# Patient Record
Sex: FEMALE | Race: WHITE
[De-identification: names, ages, dates, MRNs, and addresses within clinical notes are randomized per-mention and may not be internally consistent; named-entity substitution may affect disease eponyms.]

---

## 2017-12-23 ENCOUNTER — HOSPITAL ENCOUNTER (INPATIENT)
Dept: HOSPITAL 92 - ERS | Age: 62
LOS: 1 days | Discharge: ANOTHER HOSPITAL | DRG: 811 | End: 2017-12-24
Attending: INTERNAL MEDICINE | Admitting: INTERNAL MEDICINE
Payer: MEDICARE

## 2017-12-23 VITALS — BODY MASS INDEX: 41.5 KG/M2

## 2017-12-23 DIAGNOSIS — E87.5: ICD-10-CM

## 2017-12-23 DIAGNOSIS — E11.22: ICD-10-CM

## 2017-12-23 DIAGNOSIS — E78.5: ICD-10-CM

## 2017-12-23 DIAGNOSIS — Z90.722: ICD-10-CM

## 2017-12-23 DIAGNOSIS — E87.79: ICD-10-CM

## 2017-12-23 DIAGNOSIS — C53.9: ICD-10-CM

## 2017-12-23 DIAGNOSIS — I24.8: ICD-10-CM

## 2017-12-23 DIAGNOSIS — E83.39: ICD-10-CM

## 2017-12-23 DIAGNOSIS — N18.6: ICD-10-CM

## 2017-12-23 DIAGNOSIS — N93.8: ICD-10-CM

## 2017-12-23 DIAGNOSIS — I12.0: ICD-10-CM

## 2017-12-23 DIAGNOSIS — Z95.5: ICD-10-CM

## 2017-12-23 DIAGNOSIS — Z90.710: ICD-10-CM

## 2017-12-23 DIAGNOSIS — E83.41: ICD-10-CM

## 2017-12-23 DIAGNOSIS — Z99.2: ICD-10-CM

## 2017-12-23 DIAGNOSIS — D62: Primary | ICD-10-CM

## 2017-12-23 DIAGNOSIS — Z90.79: ICD-10-CM

## 2017-12-23 DIAGNOSIS — E87.2: ICD-10-CM

## 2017-12-23 DIAGNOSIS — E66.01: ICD-10-CM

## 2017-12-23 DIAGNOSIS — Z91.15: ICD-10-CM

## 2017-12-23 DIAGNOSIS — N25.81: ICD-10-CM

## 2017-12-23 DIAGNOSIS — I25.10: ICD-10-CM

## 2017-12-23 DIAGNOSIS — C55: ICD-10-CM

## 2017-12-23 LAB
ALP SERPL-CCNC: 58 U/L (ref 40–150)
ALT SERPL W P-5'-P-CCNC: 8 U/L (ref 8–55)
ANION GAP SERPL CALC-SCNC: 29 MMOL/L (ref 10–20)
ANION GAP SERPL CALC-SCNC: 7 MMOL/L (ref -14–95)
APTT PPP: 24.7 SEC (ref 22.9–36.1)
AST SERPL-CCNC: 10 U/L (ref 5–34)
BASOPHILS # BLD AUTO: 0.1 THOU/UL (ref 0–0.2)
BASOPHILS NFR BLD AUTO: 0.6 % (ref 0–1)
BILIRUB SERPL-MCNC: 0.3 MG/DL (ref 0.2–1.2)
BUN SERPL-MCNC: 105 MG/DL (ref 9.8–20.1)
CALCIUM SERPL-MCNC: 8.9 MG/DL (ref 7.8–10.44)
CHLORIDE SERPL-SCNC: 115 MMOL/L (ref 98–113)
CHLORIDE SERPL-SCNC: 99 MMOL/L (ref 98–107)
CO2 BLDV CALC-SCNC: 16.3 MMOL/L (ref 1–85)
CO2 SERPL-SCNC: 17 MMOL/L (ref 23–31)
COMM CRITICAL RESULTS DOC: (no result)
COMM CRITICAL RESULTS DOC: (no result)
CREAT CL PREDICTED SERPL C-G-VRATE: 0 ML/MIN (ref 70–130)
EOSINOPHIL # BLD AUTO: 0.2 THOU/UL (ref 0–0.7)
EOSINOPHIL NFR BLD AUTO: 1.8 % (ref 0–10)
GLOBULIN SER CALC-MCNC: 3.1 G/DL (ref 2.4–3.5)
GLUCOSE UR STRIP-MCNC: 250 MG/DL
HCT VFR BLD CALC: 18.8 % (ref 36–47)
HYALINE CASTS #/AREA URNS LPF: (no result) LPF
LIPASE SERPL-CCNC: 47 U/L (ref 8–78)
LYMPHOCYTES # BLD: 1.1 THOU/UL (ref 1.2–3.4)
LYMPHOCYTES NFR BLD AUTO: 10.8 % (ref 21–51)
MAGNESIUM SERPL-MCNC: 2.8 MG/DL (ref 1.6–2.6)
MONOCYTES # BLD AUTO: 0.3 THOU/UL (ref 0.11–0.59)
MONOCYTES NFR BLD AUTO: 3.1 % (ref 0–10)
NEUTROPHILS # BLD AUTO: 8.5 THOU/UL (ref 1.4–6.5)
PROT UR STRIP.AUTO-MCNC: 300 MG/DL
PROTHROMBIN TIME: 16 SEC (ref 12–14.7)
RBC # BLD AUTO: 1.82 MILL/UL (ref 4.2–5.4)
SAO2 % BLDV FROM PO2: 99.5 % (ref 0–100)
TROPONIN I SERPL DL<=0.01 NG/ML-MCNC: 2.35 NG/ML (ref ?–0.03)
TROPONIN I SERPL DL<=0.01 NG/ML-MCNC: 2.56 NG/ML (ref ?–0.03)
TROPONIN I SERPL DL<=0.01 NG/ML-MCNC: 2.73 NG/ML (ref ?–0.03)
TROPONIN I SERPL DL<=0.01 NG/ML-MCNC: 3.1 NG/ML (ref ?–0.03)
WBC # BLD AUTO: 10.1 THOU/UL (ref 4.8–10.8)

## 2017-12-23 PROCEDURE — 36415 COLL VENOUS BLD VENIPUNCTURE: CPT

## 2017-12-23 PROCEDURE — 30233N1 TRANSFUSION OF NONAUTOLOGOUS RED BLOOD CELLS INTO PERIPHERAL VEIN, PERCUTANEOUS APPROACH: ICD-10-PCS | Performed by: INTERNAL MEDICINE

## 2017-12-23 PROCEDURE — 80048 BASIC METABOLIC PNL TOTAL CA: CPT

## 2017-12-23 PROCEDURE — 82330 ASSAY OF CALCIUM: CPT

## 2017-12-23 PROCEDURE — 82010 KETONE BODYS QUAN: CPT

## 2017-12-23 PROCEDURE — 51701 INSERT BLADDER CATHETER: CPT

## 2017-12-23 PROCEDURE — 96375 TX/PRO/DX INJ NEW DRUG ADDON: CPT

## 2017-12-23 PROCEDURE — 87340 HEPATITIS B SURFACE AG IA: CPT

## 2017-12-23 PROCEDURE — 84484 ASSAY OF TROPONIN QUANT: CPT

## 2017-12-23 PROCEDURE — A4353 INTERMITTENT URINARY CATH: HCPCS

## 2017-12-23 PROCEDURE — 86901 BLOOD TYPING SEROLOGIC RH(D): CPT

## 2017-12-23 PROCEDURE — 90935 HEMODIALYSIS ONE EVALUATION: CPT

## 2017-12-23 PROCEDURE — 85610 PROTHROMBIN TIME: CPT

## 2017-12-23 PROCEDURE — 83690 ASSAY OF LIPASE: CPT

## 2017-12-23 PROCEDURE — 82553 CREATINE MB FRACTION: CPT

## 2017-12-23 PROCEDURE — 81003 URINALYSIS AUTO W/O SCOPE: CPT

## 2017-12-23 PROCEDURE — 74176 CT ABD & PELVIS W/O CONTRAST: CPT

## 2017-12-23 PROCEDURE — P9016 RBC LEUKOCYTES REDUCED: HCPCS

## 2017-12-23 PROCEDURE — 36416 COLLJ CAPILLARY BLOOD SPEC: CPT

## 2017-12-23 PROCEDURE — 85025 COMPLETE CBC W/AUTO DIFF WBC: CPT

## 2017-12-23 PROCEDURE — 93005 ELECTROCARDIOGRAM TRACING: CPT

## 2017-12-23 PROCEDURE — 80053 COMPREHEN METABOLIC PANEL: CPT

## 2017-12-23 PROCEDURE — 36430 TRANSFUSION BLD/BLD COMPNT: CPT

## 2017-12-23 PROCEDURE — 84100 ASSAY OF PHOSPHORUS: CPT

## 2017-12-23 PROCEDURE — 83735 ASSAY OF MAGNESIUM: CPT

## 2017-12-23 PROCEDURE — 71010: CPT

## 2017-12-23 PROCEDURE — 5A1D70Z PERFORMANCE OF URINARY FILTRATION, INTERMITTENT, LESS THAN 6 HOURS PER DAY: ICD-10-PCS | Performed by: INTERNAL MEDICINE

## 2017-12-23 PROCEDURE — 93306 TTE W/DOPPLER COMPLETE: CPT

## 2017-12-23 PROCEDURE — 83880 ASSAY OF NATRIURETIC PEPTIDE: CPT

## 2017-12-23 PROCEDURE — 86850 RBC ANTIBODY SCREEN: CPT

## 2017-12-23 PROCEDURE — 81015 MICROSCOPIC EXAM OF URINE: CPT

## 2017-12-23 PROCEDURE — 96365 THER/PROPH/DIAG IV INF INIT: CPT

## 2017-12-23 PROCEDURE — G0257 UNSCHED DIALYSIS ESRD PT HOS: HCPCS

## 2017-12-23 PROCEDURE — 82803 BLOOD GASES ANY COMBINATION: CPT

## 2017-12-23 PROCEDURE — 86900 BLOOD TYPING SEROLOGIC ABO: CPT

## 2017-12-23 PROCEDURE — 85730 THROMBOPLASTIN TIME PARTIAL: CPT

## 2017-12-23 NOTE — HP
DATE OF ADMISSION:  12/23/2017

 

PRIMARY CARE PHYSICIAN:  None.

 

CHIEF COMPLAINT:  Vaginal bleed and missed hemodialysis and generalized weakness.

 

HISTORY OF PRESENT ILLNESS:  Ms. Padilla is a 62-year-old female with known history of uterine cancer
 status post incomplete treatment 3 years ago and hysterectomy as well as end-stage renal disease and
 coronary artery disease and diabetes, who presented to the emergency room with the above-mentioned c
omplaint.  History is mainly obtained by the patient herself, who is somewhat a poor historian.  Medi
viviane records have been reviewed extensively.  She was last admitted to our facility with similar compl
aints in 07/2017 and was admitted by myself.  At that time, she was seen by OB/GYN as well as Oncolog
y for vaginal bleed secondary to cervical cancer and was referred back to her primary oncologist in [facility] in [place]; Dr. Navarro. Today, she tells me that she has once again failed to follow up. 
 She has 2 deaths in her family including her sister and her dad and is currently dependent on her ne
phew for her daily care and has not been able to follow up.  She says that they did call and try to m
madelin appointment, but we were unable to get the appointment at Dr. Navarro's office.

 

She presented to the emergency room today complaining of abdominal pain, weakness, and muscle crampin
g.  She reports of passing significant blood clots for the last many days.  She reports that she pass
es blood clots on and off and sometimes they are really bad.  Some months, she does not have any blee
d at all but some months is really bad.

 

Today upon presentation, she was hemodynamically stable with the blood pressure of 148/73 and pulse o
f 70.  Her examination including of gynecological examination did showed significant clot in the vagi
na, as well as significant lab abnormalities due to missed hemodialysis.  She was found to be hyperka
lemic with a potassium of 7.5 with significant metabolic acidosis.  She was found to be in gross flui
d overloaded with the BNP of 2700.  She also had elevated troponin in the range of 3.098 with repeat 
troponin of 2.728.

 

In the emergency room, 2 units packed RBC were ordered for him.  She was treated for hyperkalemia wit
h calcium chloride as well as insulin with dextrose and is now being admitted for further evaluation 
and care.  Her nephrologist, Dr. Holland has been contacted by the ER physician and she has actually 
finished an emergent hemodialysis session.  Reportedly, she did not tolerate any fluid removal.  She 
has seen and examined in the dialysis unit.

 

PAST MEDICAL HISTORY:

1.  End-stage renal disease, on hemodialysis.

2.  History of gram negative bacteremia with Klebseilla in 01/2016.

3.  Coronary artery disease with history of stent placement.

4.  Hypertension.

5.  Diabetes.

6.  Dyslipidemia.

7.  Uterine cancer with status post hysterectomy and chemotherapy x1 in 10/2015.

8.  Chronic anemia.

9.  Lower extremity ulcers, chronic.

 

PAST SURGICAL HISTORY:

1.  Breast reduction.

2.  Hysterectomy with oophorectomy.

3.  Appendectomy.

4.  Hernia repair.

5.  Cardiac stent placement.

 

ALLERGIES:  LEVOFLOXACIN and FLAGYL.

 

SOCIAL HISTORY:  She currently lives at home with her nephew, who is the primary care provider.  No h
istory of drug, tobacco, or alcohol abuse.

 

FAMILY HISTORY:  Significant for heart disease.

 

CURRENT MEDICATIONS:  Unknown.  The patient did not bring the list of the medications with her.  Acco
rding to the ER note, she takes the following carvedilol 16.25 b.i.d., Renvela 800 mg 3 tablets three
 times a day, Rohini-Emerald 0.8 mg daily, atorvastatin 20 mg daily, amitriptyline 25 mg 3 tablets at bedt
chas, Plavix 75 mg daily, and hydralazine 50 mg t.i.d.

 

REVIEW OF SYSTEMS: The following complete review of systems was negative, unless otherwise mentioned 
in the HPI or below:

Constitutional: Weight loss or gain, ability to conduct usual activities.

Skin: Rash, itching.

Eyes: Double vision, pain.

ENT/Mouth: Nose bleeding, neck stiffness, pain, tenderness.

Cardiovascular: Palpitations, dyspnea on exertion, orthopnea.

Respiratory: Shortness of breath, wheezing, cough, hemoptysis, fever or night sweats.

Gastrointestinal: Poor appetite, abdominal pain, heartburn, nausea, vomiting, constipation, or diarrh
ea.

Genitourinary: Urgency, frequency, dysuria, nocturia.

Musculoskeletal: Pain, swelling.

Neurologic/Psychiatric: Anxiety, depression.

Allergy/Immunologic: Skin rash, bleeding tendency.

 

PHYSICAL  EXAMINATION:

VITAL SIGNS:  Most recent blood pressure 141/79, pulse of 74, respirations 22, saturating 100% on carolyn
m air, and temperature on presentation; afebrile.

GENERAL:  She appears pale, weak, and tired looking, but no acute distress.  Awake, alert, oriented x
3.

HEENT:  Conjunctival pallor is noted.  No scleral icterus.  Head is normocephalic, atraumatic.  Pupil
s are equal, reactive to light and accommodation.  Extraocular movements intact.

NECK:  Supple without any lymphadenopathy, JVD, or bruit.

CHEST:  Clear to auscultation with some bibasilar rales.  No wheezes noted.

CARDIOVASCULAR:  Regular rate and rhythm is regular without any murmur, rubs or gallops.

ABDOMEN:  Obese, somewhat tender to palpation and distended.

EXTREMITIES:  Dialysis fistula with thrill and bruit present.  Some pedal edema bilaterally without a
ny erythema.

NEUROLOGIC:  Nonfocal.

SKIN:  Free of any rashes or bruises, feels warm and dry to touch.

PSYCHIATRIC:  Depressed affect.

 

LABORATORY DATA:  A 12 lead EKG by my review showed some T-wave inversion in lead 1, 2, aVL, and V3, 
and V6.  Normal sinus rhythm.

 

CBC shows a hemoglobin of 6.0 with hematocrit of 18.8, platelet count of 268.  WBC is 10.1 with 83% n
eutrophils.  PT, PTT, and INR were within normal limits.

 

Serum chemistries show potassium of 7.5 with a repeat potassium of 7.  Bicarbonate 17, anion gap 29, 
, creatinine 13.5, blood sugar 291, troponin 3.098 with repeat troponin of 2.7-8.  Beta hydrox
ybutyrate is 0.74.  Urinalysis show hyalin casts and trace of blood along with protein urine and gluc
osuria, but no bacteria.

 

IMPRESSION AND PLAN:

1.  Acute blood loss anemia.  The patient is status post transfusion of 2 units packed red blood cell
s.  We will monitor hemoglobin and hematocrit closely and consult Obstetrics/Gynecology for ongoing v
aginal bleed.  She will be in IMCU for close monitoring.  She is currently hemodynamically stable.

2.  Fluid overload, hemodialysis urgently has been done.  We will monitor fluid status closely.  Neph
rology has been consulted.

3.  Hyperkalemia.  We will recheck labs after the hemodialysis.  She has been treated emergently in [facility] emergency room with potassium chloride and insulin.

4.  Elevated cardiac enzymes.  This is most likely secondary to demand ischemia from significant bloo
d loss.  At this time, we will repeat the troponin and obtain a transthoracic echocardiogram.  The la
st one for her was done in 09/2016, which was rather unremarkable.  She also had a nuclear medicine s
tress test done at the same time, which did not show any active ischemia.  If her troponins continue 
to trend upwards, we would request consultations with Cardiology.  At this time, we will restart her 
home medications except for the Plavix given the vaginal bleed.  The patient does not have any chest 
pain at this time.

5.  Anion gap metabolic acidosis.  This is secondary to acute on chronic kidney insufficiency.  Hemod
ialysis as indicated.  I have discussed this with Dr. Holland and the patient will be treated with da
erick hemodialysis until the electrolyte abnormalities are settle.

6.  Acute on chronic kidney insufficiency due to missed hemodialysis.  Hemodialysis as tolerated as p
er Nephrology.

7.  Hyperphosphatemia and hypomagnesemia.  Once again electrolyte abnormalities will be taken care of
 by the hemodialysis.  This is secondary to  acute on chronic kidney insufficiency.

8.  History of hypertension, currently controlled.  We will restart her home medications once confirm
ed.

9.  History of uterine cancer.  Once again, I have encouraged the patient to call her oncologist and 
make a followup appointment.  Because of the Christmas holidays, the offices closed for the next 3 da
ys.  I will discuss this with her nephew, who is currently responsible for her care and emphasized th
e need for followups.

10.  Dyslipidemia.  We will restart her Statin, once the dose is confirmed.

11.  Code status:  FULL CODE for now.  Discussed with the patient.

12.  Add sequential compression devices for gastrointestinal prophylaxis and proton pump inhibitors f
or GI prophylaxis and sequential compression devices for deep venous thrombosis prophylaxis.  Avoid p
harmacological deep venous thrombosis prophylaxis at this time due to active vaginal bleed.

 

DISPOSITION:  Ms. Padilla is currently being admitted due to acute on chronic kidney insufficiency as
 well as acute blood loss anemia due to vaginal bleed. 

 

Estimated length of stay is at least 2-3 midnight.  She will be admitted to [facility] and further manageme
nt will depend upon her clinical course.

## 2017-12-23 NOTE — CON
DATE OF SERVICE:  12/23/2017

 

TYPE OF CONSULTATION:  Gynecologic.

 

REASON FOR CONSULTATION:  Heavy vaginal bleeding and anemia.

 

HISTORY OF PRESENT ILLNESS:  At the time of presentation, Ms. Padilla is a 62-year-old female who is 
actually known to me from her admission in 07/2017 for similar complaints.  At that time, Ms. Padilla
 was found to have likely recurrence of the clear cell carcinoma for which she had a hysterectomy by 
Dr. Navarro in [place].  Unfortunately, Ms. Padilla has not had a good followup for this condition and h
as had progression of the fungating mass in the vagina and at the vaginal cuff on the abdominal side 
with intermittent heavy vaginal bleeding.  The patient reports sustaining lower abdominal pain that s
he complained of several months ago as well as early satiety, rare nausea and vomiting.  She states s
he does have regular bowel movements.  The patient states that she does still urinate despite her end
-stage renal disease and dialysis and that is unchanged.  She denies any cardiovascular or respirator
y complaints currently but did receive dialysis earlier today.  She does complain of worsening in her
 weakness and fatigue.

 

LIMITED REVIEW OF SYSTEMS:  Per HPI.

 

PAST MEDICAL HISTORY:  Obtained from the patient's chart, clear cell adenocarcinoma of the uterus, en
d-stage renal disease, coronary artery disease, history of MI with stent placement, type 2 diabetes, 
chronic hypertension.

 

PAST SURGICAL HISTORY:  AWA-BSO, breast reduction, appendectomy, hernia repair.

 

ALLERGIES:  LEVOFLOXACIN and METRONIDAZOLE.

 

CURRENT MEDICATIONS:  Please refer to the patient's current medical record.

 

PHYSICAL EXAMINATION:

VITAL SIGNS:  Temperature 97.6, pulse 83, respiratory rate 22, blood pressure 141/32.

GENERAL:  Nontoxic-appearing female, in no acute distress.

SKIN:  The patient seems reasonably oriented and actually remembers me from her hospital stay in 07/2
017 but does not seem to remember some details of her medical care.  There are no family members pres
ent in the room.  The patient states she lives with her niece and her nephew generally takes her to h
er dialysis appointment.

HEENT:  Normocephalic, atraumatic.

LUNGS:  Clear to auscultation in the upper lung fields.

CARDIOVASCULAR:  Regular rate and rhythm.

ABDOMEN:  Obese, tender in the lower midline pelvis.

EXTREMITIES:  No cyanosis or clubbing, but there is marked edema 2+ on the right, 3+ on the left with
 pitting.  The patient states that her edema is always greater on the left and there is some associat
ed left leg pain which is also chronic.

 

LABORATORY DATA AND IMAGING STUDIES:  White blood cell count 10.1, hemoglobin 6.0, hematocrit 18.8, p
latelets 268,000.  PT 16, PTT 24.7, INR 1.3.  Sodium 137, potassium 7.5, , creatinine 13.5, AS
T and ALT are 10 and 8, respectively.  Urinalysis is negative for pyuria.

 

CT scan of the abdomen and pelvis without contrast is notable for marked enlargement of progressive n
odularity of the mass in the region of the vaginal cuff and cervix as well as extensive worsening of 
left-sided pelvic adenopathy since her prior scan on 07/14/2017, enlarging lymph nodes in the left up
per iliac chain.  No mention is made of free fluid in the pelvis.

 

ASSESSMENT AND PLAN:  Ms. Padilla is a very pleasant 62-year-old female with multiple medical problem
s and lost to follow up and treatment for her clear cell adenocarcinoma of the uterus for which she d
id receive a AWA and BSO.  The patient only received 1 dose of chemotherapy and has not been seen aft
er that.  After the patient's discharge in 07/2017, she was supposed to follow up with Oncology who c
ould hopefully discuss treatment options for controlling the patient's bleeding and pain.  Apparently
, this follow up never happened.  The patient does not remember seeing an oncologist or going to an O
ncology visit, although she does remember going to dialysis treatments and thinks that those were the
 only doctors' visits that she has had.  The patient is not, at the time of my evaluation, having hea
vy vaginal bleeding.  I did defer the exam simply because there was no active bleeding at this time a
nd given that she has a fungating vaginal mass, hesitated to disturb it and cause more bleeding.

 

Ultimately, we will need to have Oncology consult on this patient and make recommendations for treatm
ent.  Additionally, perhaps  can be employed to ensure that the patient has an appoint
ment with transportation if needed and to possibly explore the factors that may be preventing her fro
m receiving the care she needs as an outpatient.

## 2017-12-23 NOTE — CON
DATE OF CONSULTATION:  12/23/2017

 

CONSULTING PHYSICIAN:  Dr. Micaela Juarez.

 

REASON FOR CONSULTATION:  End-stage renal disease.

 

REASON FOR ADMISSION:  Bleeding, anemia, hyperkalemia, and weakness.

 

HISTORY OF PRESENT ILLNESS:  This is a 62-year-old female with history of uterine cancer, end-stage r
enal disease, coronary artery disease, diabetes, who came to the hospital with weakness.  She missed 
dialysis for almost a week and was feeling weak.  She was found to have potassium of 7 with anemia, a
nd hemoglobin of 6, she was complaining of vaginal bleeding.

 

The patient was seen and the patient initially refused dialysis, but was agreeable after counseling. 
 No fever or chills, no nausea, vomiting, or diarrhea.  The patient was complaining of leg pain.

 

PAST MEDICAL HISTORY:  Positive for end-stage renal disease, uterine cancer, anemia, coronary artery 
disease, hypertension, type 1 diabetes, and obesity.

 

PAST SURGICAL HISTORY:  Breast reduction surgery, hysterectomy, appendectomy, hernia repair, and card
iac stent placement.

 

HOME MEDICATIONS:  Carvedilol, Renvela, Rohini-Emerald, atorvastatin, amitriptyline, Plavix, and hydralazi
ne.

 

ALLERGIES:  LEVOFLOXACIN and FLAGYL.

 

SOCIAL HISTORY:  No smoking, alcohol, or illicit drug abuse.

 

FAMILY HISTORY:  Positive for heart disease.

 

REVIEW OF SYSTEMS:  The following complete review of systems was negative, unless otherwise mentioned
 in the HPI or below.

 

PHYSICAL EXAMINATION:

GENERAL:  This is an obese female in no apparent distress.

VITAL SIGNS:  Temperature 97.6, pulse 83, respiratory rate 22, and blood pressure 114/32.

HEENT:  Atraumatic, normocephalic.  Oral mucosa is moist.

NECK:  Supple, no masses.

CARDIOVASCULAR:  S1 and S2 heard.  Rate and rhythm regular.

RESPIRATORY:  Clear.

MUSCULOSKELETAL:  1+ edema.

DERMATOLOGIC:  No skin rash.

NEUROLOGIC:  Alert and awake.

PSYCHIATRIC:  Mood and affect normal.

 

LABORATORY AND X-RAY FINDINGS:  Hemoglobin was 6, potassium was 7.

 

ASSESSMENT AND PLAN:

1.  Severe hyperkalemia, emergent dialysis.

2.  End-stage renal disease as above.

3.  Anemia.  We will transfuse with dialysis.

4.  Edema, controlled.

5.  Hypertension.

5.  Vaginal bleeding, follow up with primary team.

 

The plan is to transfuse with dialysis and will have dialysis.  Repeat labs in the morning.  If potas
sium remains elevated, we will have another session of dialysis in the morning.

 

Thank you for the consult.

## 2017-12-23 NOTE — CT
ABDOMEN AND PELVIC CT SCAN WITHOUT IV CONTRAST:

12/23/17

 

The lung bases appear clear. There is some cardiomegaly with extensive three vessel coronary artery c
alcific disease. There is extensive vascular calcifications. There is some minimal increased attenuat
ion density in the dependent portion of the gallbladder possibly either very small stones or thick sl
udge without gallbladder wall thickening or pericholecystic fluid. The kidneys are markedly small best
aterally, having the appearance of longstanding chronic nonspecific renal disease without hydronephro
sis. The visualized pancreas, spleen, and adrenal glands are unremarkable. In the pelvis, there is a 
lobulated mass in the region of the cervix measuring approximately 4.7 x 6.2 cm. In addition, there i
s a lobulated mass of adenopathy in the left pelvis measuring approximately 5.8 x 6.6 cm. This adenop
athy area has markedly increased in size when compared to the 7/14/17 study. The mass in the region o
f the cervix also appears to be more lobulated and has enlarged since the prior study. There appears 
to be some new adenopathy, somewhat more caudally in the left pelvic sidewall at the level of the hip
 joint. 

 

IMPRESSION:  

Marked enlargement and progressive nodularity of mass in the region of the vaginal cuff and cervix as
 well as extensive worsening of left sided pelvic adenopathy since prior 7/14/17 study. Certainly wor
risome for worsening cervical cancer and metastasis. Small chronic kidneys bilaterally, stable. Stabl
e increased density in the dependent portion of the gallbladder. Enlarging lymph node in the left upp
er iliac chain. 

 

POS: DANUTA

## 2017-12-24 VITALS — DIASTOLIC BLOOD PRESSURE: 47 MMHG | SYSTOLIC BLOOD PRESSURE: 109 MMHG

## 2017-12-24 VITALS — TEMPERATURE: 98 F

## 2017-12-24 LAB
ANION GAP SERPL CALC-SCNC: 16 MMOL/L (ref 10–20)
BASOPHILS # BLD AUTO: 0.1 THOU/UL (ref 0–0.2)
BASOPHILS NFR BLD AUTO: 0.7 % (ref 0–1)
BUN SERPL-MCNC: 38 MG/DL (ref 9.8–20.1)
CALCIUM SERPL-MCNC: 8.3 MG/DL (ref 7.8–10.44)
CHLORIDE SERPL-SCNC: 99 MMOL/L (ref 98–107)
CO2 SERPL-SCNC: 27 MMOL/L (ref 23–31)
CREAT CL PREDICTED SERPL C-G-VRATE: 13 ML/MIN (ref 70–130)
EOSINOPHIL # BLD AUTO: 0.2 THOU/UL (ref 0–0.7)
EOSINOPHIL NFR BLD AUTO: 2.3 % (ref 0–10)
HCT VFR BLD CALC: 21.5 % (ref 36–47)
LYMPHOCYTES # BLD: 1.7 THOU/UL (ref 1.2–3.4)
LYMPHOCYTES NFR BLD AUTO: 18.2 % (ref 21–51)
MONOCYTES # BLD AUTO: 0.6 THOU/UL (ref 0.11–0.59)
MONOCYTES NFR BLD AUTO: 6.5 % (ref 0–10)
NEUTROPHILS # BLD AUTO: 6.9 THOU/UL (ref 1.4–6.5)
RBC # BLD AUTO: 2.19 MILL/UL (ref 4.2–5.4)
TROPONIN I SERPL DL<=0.01 NG/ML-MCNC: 2.8 NG/ML (ref ?–0.03)
WBC # BLD AUTO: 9.6 THOU/UL (ref 4.8–10.8)

## 2017-12-24 PROCEDURE — 30233N1 TRANSFUSION OF NONAUTOLOGOUS RED BLOOD CELLS INTO PERIPHERAL VEIN, PERCUTANEOUS APPROACH: ICD-10-PCS | Performed by: INTERNAL MEDICINE

## 2017-12-24 PROCEDURE — 02HV33Z INSERTION OF INFUSION DEVICE INTO SUPERIOR VENA CAVA, PERCUTANEOUS APPROACH: ICD-10-PCS | Performed by: SURGERY

## 2017-12-24 NOTE — OP
DATE OF PROCEDURE:  12/24/2017

 

PREOPERATIVE DIAGNOSES:  Anemia, vaginal bleeding, end-stage renal disease.

 

POSTOPERATIVE DIAGNOSES:  Anemia, vaginal bleeding, end-stage renal disease.

 

PROCEDURE:  Left subclavian vein triple lumen catheter.

 

SURGEON:  Dr. Rex Xiao

 

ANESTHESIA:  1%  Xylocaine.

 

PROCEDURE:  At the patient's bedside, left periclavicular area was prepared with chloraprep, draped i
n routine fashion.  1% Xylocaine infiltrated into skin and subcutaneous tissue about the operative si
te.  Trocar catheter cannulated the subclavian vein and J-wire threaded.  Seldinger technique was use
d to place a triple lumen catheter, removing the J-wire, securing the catheter with 2 interrupted sut
ures of 3-0 silk.  Biopatch sterile dressing applied.  Each port aspirated blood and transected solut
ion and flushed each port.  Patient tolerated the procedure well.

## 2017-12-24 NOTE — PRG
DATE OF SERVICE:  12/24/2017

 

HISTORY OF PRESENT ILLNESS:  The patient is a 62-year-old female with known recurrent clear cell carc
inoma located at the cuff of the vagina.  She presented for vaginal bleeding and has been transfused 
now 3 units of packed red blood cells.  The patient at this time is having minimal-to-no vaginal blee
ding, though presented after having very heavy vaginal bleeding that has spontaneously stopped.

 

PHYSICAL EXAMINATION:

VITAL SIGNS:  Currently, 128/40, temperature 98.2, respiratory rate 16, satting 95% on room air with 
a pulse of 69.

GENERAL:  She appears to be in no acute distress at this time, sitting up quiet in the bed.

 

LABORATORY STUDIES:  Her hemoglobin is 7.0 after 2 units of packed red blood cells, Hematocrit of 21.
5, and platelets of 221,000.  CT scan demonstrates marked enlargement and progressive nodularity of t
he mass in the region of the vaginal cuff as well as extensive worsening of left-sided pelvic adenopa
thy since 07/14/2017.

 

ASSESSMENT:  The patient is a 62-year-old female with acute vaginal bleeding that had since spontaneo
usly stopped.  Bleeding is likely connected with what is likely recurrent uterine cancer at the cuff 
of her vagina that is growing and likely a spread into the right pelvis and possible to other parts o
f the body.  Acutely patient is stable; however, unpredictable nature of her heavy vaginal bleeding m
akes the need for evaluation by Gynecology-Oncology and a treatment plan put in place urgent.  The pa
vahe did not follow up as planned in 07/2017 nor followed up appropriately at the time of her diagno
sis and surgery several years ago.  My recommendations at this time would be a [id number] t
zaysfer where she can be evaluated by Gynecology-Oncology and a treatment plan put in place.  That pl
an likely would include radiation to the vaginal cuff, which could in the long-term control her bleed
ing.  The gynecologic oncologist, who agreed to see her back in July is Dr. Navarro, [phone].  Nelson red, at this point in time transfer to any location where she can continue receiving the hospital car
e she needs and be seen by a gynecologic oncologist for evaluation and treatment would be best.

## 2017-12-24 NOTE — PRG
DATE OF SERVICE:  12/25/2017

 

SUBJECTIVE:  Patient was seen and examined at bedside and overnight events noted.  Patient denies any
 shortness of breath or chest pain or palpitation.  No history of nausea or vomiting or diarrhea or f
ever or chills or cramps.

 

OBJECTIVE:

GENERAL:  This is a morbidly obese white female in no apparent distress.

VITAL SIGNS:  Temperature 97, pulse 91, respiratory rate 16, blood pressure 146/34.

HEENT:  Atraumatic, normocephalic.  Oral mucosa is moist.

NECK:  Supple.

CARDIOVASCULAR:  S1, S2 heard.  Rate and rhythm regular.

RESPIRATORY:  Clear to auscultation.

GASTROINTESTINAL:  Abdomen is soft.

MUSCULOSKELETAL:  No tenderness.  No edema.

DERMATOLOGIC:  No skin rash.

NEUROLOGIC:  Alert and awake and oriented x3.  No focal neurologic deficits. Moving all the extremiti
es.

PSYCHIATRIC:  Mood and affect normal.

 

LABORATORY DATA:  Potassium is 4.5, BUN is 38, creatinine 6.7, and hemoglobin is 7.7.

 

ASSESSMENT AND PLAN:

1.  End-stage renal disease on hemodialysis today and potassium level is better.  No acute indication
 for dialysis.

2.  Hyperkalemia, much better with dialysis.

3.  Fluid overload, stable.

4.  Anemia.  Hemoglobin is slightly better after transfusion, but still around 7.  Agree with another
 unit of transfusion.  The patient does not need dialysis with transfusion today.  We will monitor.  
If needed, we will arrange dialysis tomorrow.

5.  Hypertension, stable.

6.  Anemia, most likely secondary to blood loss.

7.  Morbid obesity.

8.  Edema, controlled.

 

Plan is to continue on dialysis as tolerated.  We will continue close monitoring of labs.

## 2017-12-24 NOTE — RAD
UPRIGHT CHEST ONE VIEW:

 

History: 

62-year-old female with history of cervical cancer with left central line placement for position eval
uation. 

 

Comparison: 

9-7-16

 

FINDINGS: 

There is a left subclavian catheter with the tip in the superior vena cava. Left subclavian vascular 
stent. Minimal cardiomegaly. No evidence for pneumothorax or pleural effusion or other acute process.
 

 

IMPRESSION: 

Left subclavian catheter placement without complication. Minimal cardiomegaly. 

 

POS: DANUTA

## 2017-12-24 NOTE — PDOC.PN
- Subjective


Encounter Start Date: 12/24/17


Encounter Start Time: 15:13


Subjective: feels better.stiil some on and off bleed but minor


-: no chest pain/sob.


-: c/o lower abdominal pain





- Objective


MAR Reviewed: Yes


Vital Signs & Weight: 


 Vital Signs (12 hours)











  Temp Pulse Pulse Resp BP BP BP


 


 12/24/17 15:02  98.0 F   66  16   123/44 L 


 


 12/24/17 12:30  98.2 F   69  16   128/40 L 


 


 12/24/17 11:50  97.2 F L  66   16    107/36 L


 


 12/24/17 09:02      134/41 L  


 


 12/24/17 08:00  98.4 F  66   22 H    133/57 L


 


 12/24/17 04:30  98.2 F  68   18    114/64














  Pulse Ox


 


 12/24/17 15:02  94 L


 


 12/24/17 12:30  95


 


 12/24/17 11:50  94 L


 


 12/24/17 09:02 


 


 12/24/17 08:00  96


 


 12/24/17 04:30  95








 Weight











Weight                         213 lb 4.8 oz














I&O: 


 











 12/23/17 12/24/17 12/25/17





 06:59 06:59 06:59


 


Intake Total  1060 350


 


Output Total  200 


 


Balance  860 350











Result Diagrams: 


 12/24/17 04:22





 12/24/17 04:22


Additional Labs: 


 Accuchecks











  12/24/17 12/24/17 12/23/17





  11:28 06:24 21:10


 


POC Glucose  170 H  167 H  186 H








 Laboratory Tests











  12/23/17 12/23/17 12/23/17





  06:21 06:36 09:33


 


Anion Gap   29 H 


 


Creatinine   13.50 H 


 


Troponin I  3.098 H*   2.728 H*














  12/23/17 12/23/17 12/24/17





  14:36 17:41 04:22


 


Anion Gap    16


 


Creatinine    6.79 H


 


Troponin I  2.560 H*  2.351 H* 














Phys Exam





- Physical Examination


Constitutional: NAD


pale


HEENT: PERRLA, moist MMs, sclera anicteric, oral pharynx no lesions


Neck: no nodes, no JVD, supple, full ROM


Respiratory: no wheezing, no rales, no rhonchi, clear to auscultation bilateral


Cardiovascular: RRR, no significant murmur


Gastrointestinal: soft, no distention, positive bowel sounds


slight TTP lower abdomen


Musculoskeletal: no edema, pulses present


Neurological: non-focal, normal sensation, moves all 4 limbs


Psychiatric: normal affect, A&O x 3


Skin: no rash





Dx/Plan


(1) Acute blood loss anemia


Code(s): D62 - ACUTE POSTHEMORRHAGIC ANEMIA   Status: Acute   





(2) Fluid overload


Code(s): E87.70 - FLUID OVERLOAD, UNSPECIFIED   Status: Acute   





(3) Demand ischemia of myocardium


Code(s): I24.8 - OTHER FORMS OF ACUTE ISCHEMIC HEART DISEASE   Status: Acute   





(4) ESRD (end stage renal disease)


Code(s): N18.6 - END STAGE RENAL DISEASE   Status: Chronic   





(5) Vaginal bleeding, abnormal


Code(s): N93.9 - ABNORMAL UTERINE AND VAGINAL BLEEDING, UNSPECIFIED   Status: 

Chronic   Comment: History of uterine cancer.


To f/u with oncology in [place].   





(6) Coronary artery disease


Code(s): I25.10 - ATHSCL HEART DISEASE OF NATIVE CORONARY ARTERY W/O ANG PCTRS 

  Status: Chronic   





(7) Diabetes mellitus


Code(s): E11.9 - TYPE 2 DIABETES MELLITUS WITHOUT COMPLICATIONS   Status: 

Chronic   


Qualifiers: 


   Diabetes mellitus complication status: with kidney complications   Diabetes 

mellitus complication detail: with chronic kidney disease 





(8) Dyslipidemia


Code(s): E78.5 - HYPERLIPIDEMIA, UNSPECIFIED   Status: Chronic   





(9) Hypertension


Code(s): I10 - ESSENTIAL (PRIMARY) HYPERTENSION   Status: Chronic   





(10) Morbid obesity with BMI of 45.0-49.9, adult


Code(s): E66.01 - MORBID (SEVERE) OBESITY DUE TO EXCESS CALORIES; Z68.42 - BODY 

MASS INDEX (BMI) 45.0-49.9, ADULT   Status: Chronic   





(11) Secondary hyperparathyroidism of renal origin


Code(s): N25.81 - SECONDARY HYPERPARATHYROIDISM OF RENAL ORIGIN   Status: 

Chronic   





(12) Uterine cancer


Code(s): C55 - MALIGNANT NEOPLASM OF UTERUS, PART UNSPECIFIED   Status: Chronic

   Comment: s/p AWA BSO   





- Plan


PT/OT, , DVT proph w/SCDs


discussed case w  & then  @ [place] [facility]


-:  has accepted the pt for inpt admission as she needs Gyn-Onc care


-: cont to monitor H/H.will give 1 more unit prbc during transfer.


-: hemodynamically stable.care discussed w pt as well.


-: awaiting transfer.HD per nephrology





* .








Review of Systems





- Review of Systems


Constitutional: weakness, malaise.  negative: fever, chills, sweats, other


ENT: negative: Ear Pain, Ear Discharge, Nose Pain, Nose Discharge, Nose 

Congestion, Mouth Pain, Mouth Swelling, Throat Pain, Throat Swelling, Other


Cardiovascular: negative: chest pain, palpitations, orthopnea, paroxysmal 

nocturnal dyspnea, edema, light headedness, other


Gastrointestinal: Abdominal Pain.  negative: Nausea, Vomiting, Diarrhea, 

Constipation, Melena, Hematochezia, Other


Genitourinary: negative: Dysuria, Frequency, Incontinence, Hematuria, Retention

, Other


Musculoskeletal: negative: Neck Pain, Shoulder Pain, Arm Pain, Back Pain, Hand 

Pain, Leg Pain, Foot Pain, Other


Neurological: negative: Weakness, Numbness, Incoordination, Change in Speech, 

Confusion, Seizures, Other





- Medications/Allergies


Allergies/Adverse Reactions: 


 Allergies











Allergy/AdvReac Type Severity Reaction Status Date / Time


 


ofloxacin [From Floxin] Allergy Mild  Verified 12/23/17 15:25


 


metronidazole [From Flagyl] Allergy   Verified 12/23/17 15:25











Medications: 


 Current Medications





Acetaminophen (Tylenol)  650 mg PO Q4H PRN


   PRN Reason: Headache/Fever or Mild Pain


Acetaminophen (Tylenol)  650 mg PO Q4H PRN


   PRN Reason: Headache/Fever or Pain


Al Hydroxide/Mg Hydroxide (Maalox)  15 ml PO Q4H PRN


   PRN Reason: Heartburn  or Indigestion


Al Hydroxide/Mg Hydroxide (Maalox)  30 ml PO Q6H PRN


   PRN Reason: Heartburn  or Indigestion


Amitriptyline HCl (Elavil)  75 mg PO HS [facility]


   Last Admin: 12/23/17 21:18 Dose:  75 mg


Atorvastatin Calcium (Lipitor)  20 mg PO [facility]


   Last Admin: 12/23/17 21:19 Dose:  20 mg


Benzonatate (Tessalon)  100 mg PO Q4H PRN


   PRN Reason: Cough


Bisacodyl (Dulcolax)  10 mg PO DAILYPRN PRN


   PRN Reason: Constipation


Bisacodyl (Dulcolax)  10 mg PO DAILYPRN PRN


   PRN Reason: Constipation


Calcium Carbonate (Tums)  1,000 mg PO Q4H PRN


   PRN Reason: Heartburn  or Indigestion


Carvedilol (Coreg)  6.25 mg PO BID [facility]


   Last Admin: 12/24/17 09:02 Dose:  6.25 mg


Clonidine (Catapres)  0.1 mg PO Q4H PRN


   PRN Reason: Systolic BP > 160


Dextrose/Water (Dextrose 50%)  25 gm SLOW IVP PRN PRN


   PRN Reason: Hypoglycemia


Glucagon (Glucagon)  1 mg IM PRN PRN


   PRN Reason: Hypoglycemia


Guaifenesin (Robitussin Sf)  200 mg PO Q4H PRN


   PRN Reason: Cough


Hydralazine HCl (Apresoline)  10 mg SLOW IVP Q4H PRN


   PRN Reason: Systolic BP > 170


Dextrose/Water (D5w)  1,000 mls @ 0 mls/hr IV .Q0M PRN; As Directed


   PRN Reason: Hypoglycemia


Insulin Human Lispro (Humalog)  0 units SC .MODERATE SLIDING SC PRN


   PRN Reason: Moderate Correctional Scale


Insulin Human Lispro (Humalog)  0 units SC .BEDTIME SLIDING SC PRN


   PRN Reason: Bedtime Correctional Scale


Loratadine (Claritin)  10 mg PO DAILYPRN PRN


   PRN Reason: Sinus Symptoms


Lorazepam (Ativan)  1 mg PO Q4H PRN


   PRN Reason: Anxiety/Agitation


   Last Admin: 12/23/17 16:33 Dose:  1 mg


Nitroglycerin (Nitrostat)  0.4 mg SL Q5MIN PRN


   PRN Reason: Chest Pain


Ondansetron HCl (Zofran)  4 mg IVP Q6H PRN


   PRN Reason: Nausea/Vomiting


Ondansetron HCl (Zofran)  4 mg IVP Q6H PRN


   PRN Reason: Nausea/Vomiting


Pantoprazole Sodium (Protonix)  40 mg PO 0900 [facility]


   Last Admin: 12/24/17 09:02 Dose:  40 mg


Polyethylene Glycol (Miralax)  17 gm PO DAILY PRN


   PRN Reason: Constipation


Senna (Senokot)  2 tab PO HSPRN PRN


   PRN Reason: Constipation


Senna (Senokot)  2 tab PO HSPRN PRN


   PRN Reason: Constipation


Sevelamer Carbonate (Renvela)  800 mg PO [facility]


   Last Admin: 12/24/17 12:16 Dose:  800 mg


Tramadol HCl (Ultram)  50 mg PO Q4H PRN


   PRN Reason: Moderate Pain (4-6)


   Last Admin: 12/24/17 09:33 Dose:  50 mg

## 2017-12-25 NOTE — DIS
PRIMARY CARE PHYSICIAN:  Jacobo Rose at [place], [place].

 

DISCHARGE DIAGNOSES:

1.  Acute blood loss anemia secondary to vaginal bleed.

2.  Vaginal bleed secondary to cervical and/or uterine cancer.

3.  Fluid overload due to missed hemodialysis.

4.  Demand ischemia.

5.  End-stage renal disease, on hemodialysis.

6.  Hypokalemia on presentation which resolved with hemodialysis.

7.  History of coronary artery disease.

8.  Diabetes mellitus.

9.  Dyslipidemia.

10.  Hypertension.

11.  Secondary hyperparathyroidism of renal origin.

12.  History of uterine cancer status post total abdominal hysterectomy and bilateral salpingo-oophor
ectomy.  

 

CONSULTATIONS:  

1.  OB/GYN; Dr. Fletcher and Dr. Carson

2.  Nephrology, Dr. Holland

3.  General Surgery, Dr. Xiao for central line placement.

 

DISCHARGE DISPOSITION:   Transferred to [facility] under the care of Dr. Navarro for hig
her level of acute care.

 

PROCEDURES PERFORMED IN THE HOSPITAL:  

1.  Placement of a left subclavian central line by Dr. Xiao.

2.  Transthoracic echocardiogram which showed EF of 50-55% with normal left atrium and left ventricul
ar size.  Mild to moderate mitral regurgitation seen.

3.  CT scan of the abdomen and pelvis which shows marked enlargement and progressive nodularity of ma
ss in the region of the vaginal cuff and cervix as well as extensive worsening of the left-sided pelv
ic adenopathy since 07/2017.  Enlarging lymph node in the left upper iliac chain also noticed.

 

DISCHARGE MEDICATIONS:  Same as home medications including Ultram as needed, hydralazine 50 mg p.o. t
.i.d., this was held while she was in the hospital.  Clonidine 0.1 mg as needed, Renvela 800 mg p.o. 
t.i.d., MiraLax daily as needed, Protonix 40 mg daily, Nitrostat sublingual 0.4 mg every 5 minutes as
 needed, Lantus 35 units in the morning and 20 units in the evening, Coreg 6.25 mg p.o. b.i.d.  This 
is to be used if her systolic blood pressure is more than 120, Tums as needed, Lipitor 20 mg daily, E
lavil 75 mg daily, Tylenol as needed.

 

HISTORY OF PRESENT ILLNESS:  Ms. Padilla is a 62-year-old female with known history of uterine cancer
 who came to the hospital with complaints of vaginal bleeding.  She is a dialysis patient due to end-
stage renal disease and has missed hemodialysis because of persistent vaginal bleed.  Upon presentati
on, she was found to be in gross fluid overload with hyperkalemia and acute on chronic kidney insuffi
ciency as well as significant acute blood loss anemia with a hemoglobin of 6.0.  She was admitted to 
[facility] and was transfused with 2 units of packed red blood cells and OB/GYN was consulted.

 

HOSPITAL COURSE:  The patient has no history of uterine cancer status post incomplete treatment excep
t for AWA BSO due to financial and social reasons 1 year ago.  Once again, OB/GYN was consulted and a
 CT scan was repeated which showed marked enlargement of the mass.

 

OB/GYN, Dr. Carson and Dr. Fletcher saw the patient and recommended that her fungating vaginal cuff m
ass is very friable and prone to bleed and he would require inpatient transfer to her GYN/Oncologist 
at [facility]; Dr. Navarro.  Dr. Navarro was contacted by myself and he graciously accepted
 the patient.  She was transferred after she received third unit of packed RBCs and a central line wa
s placed in.  She did receive dialysis on the day of admission prior to discharge and her electrolyte
 abnormalities have resolved.  

 

She did have elevated troponin from 3.098 to 2.797 which is likely secondary to demand ischemia from 
significant blood loss.

 

The patient was seen and examined prior to discharge.  Please see hospitalist progress note from the 
date of discharge.  She was hemodynamically stable and was transferred by ambulance to [facility].  Air transport was offered to the patient, but the patient declined it as she is very a
fraid of flying.

 

Please see day to day progress note for further details.

## 2018-02-02 ENCOUNTER — HOSPITAL ENCOUNTER (INPATIENT)
Dept: HOSPITAL 92 - ERS | Age: 63
LOS: 10 days | Discharge: NURSING, REHAB OR LONG-TERM CARE | DRG: 981 | End: 2018-02-12
Attending: INTERNAL MEDICINE | Admitting: INTERNAL MEDICINE
Payer: MEDICARE

## 2018-02-02 VITALS — BODY MASS INDEX: 43.8 KG/M2

## 2018-02-02 DIAGNOSIS — E11.649: ICD-10-CM

## 2018-02-02 DIAGNOSIS — I13.11: Primary | ICD-10-CM

## 2018-02-02 DIAGNOSIS — Z86.718: ICD-10-CM

## 2018-02-02 DIAGNOSIS — D69.6: ICD-10-CM

## 2018-02-02 DIAGNOSIS — D63.1: ICD-10-CM

## 2018-02-02 DIAGNOSIS — Z99.2: ICD-10-CM

## 2018-02-02 DIAGNOSIS — I87.1: ICD-10-CM

## 2018-02-02 DIAGNOSIS — T82.856A: ICD-10-CM

## 2018-02-02 DIAGNOSIS — L89.154: ICD-10-CM

## 2018-02-02 DIAGNOSIS — N18.6: ICD-10-CM

## 2018-02-02 DIAGNOSIS — E78.5: ICD-10-CM

## 2018-02-02 DIAGNOSIS — E66.01: ICD-10-CM

## 2018-02-02 DIAGNOSIS — Z79.4: ICD-10-CM

## 2018-02-02 DIAGNOSIS — E87.5: ICD-10-CM

## 2018-02-02 DIAGNOSIS — I24.8: ICD-10-CM

## 2018-02-02 DIAGNOSIS — Z85.41: ICD-10-CM

## 2018-02-02 DIAGNOSIS — E87.2: ICD-10-CM

## 2018-02-02 DIAGNOSIS — N25.81: ICD-10-CM

## 2018-02-02 DIAGNOSIS — I25.10: ICD-10-CM

## 2018-02-02 DIAGNOSIS — C55: ICD-10-CM

## 2018-02-02 DIAGNOSIS — E11.22: ICD-10-CM

## 2018-02-02 DIAGNOSIS — E87.70: ICD-10-CM

## 2018-02-02 LAB
ALBUMIN SERPL BCG-MCNC: 2.8 G/DL (ref 3.4–4.8)
ALP SERPL-CCNC: 91 U/L (ref 40–150)
ALT SERPL W P-5'-P-CCNC: 12 U/L (ref 8–55)
ANION GAP SERPL CALC-SCNC: 22 MMOL/L (ref 10–20)
APTT PPP: 58.3 SEC (ref 22.9–36.1)
AST SERPL-CCNC: 18 U/L (ref 5–34)
BASOPHILS # BLD AUTO: 0 THOU/UL (ref 0–0.2)
BASOPHILS NFR BLD AUTO: 0.6 % (ref 0–1)
BILIRUB SERPL-MCNC: 0.5 MG/DL (ref 0.2–1.2)
BUN SERPL-MCNC: 60 MG/DL (ref 9.8–20.1)
CALCIUM SERPL-MCNC: 8.7 MG/DL (ref 7.8–10.44)
CHLORIDE SERPL-SCNC: 94 MMOL/L (ref 98–107)
CK MB SERPL-MCNC: 2.1 NG/ML (ref 0–6.6)
CK SERPL-CCNC: 82 U/L (ref 29–168)
CO2 SERPL-SCNC: 19 MMOL/L (ref 23–31)
CREAT CL PREDICTED SERPL C-G-VRATE: 0 ML/MIN (ref 70–130)
EOSINOPHIL # BLD AUTO: 0 THOU/UL (ref 0–0.7)
EOSINOPHIL NFR BLD AUTO: 0.5 % (ref 0–10)
GLOBULIN SER CALC-MCNC: 3 G/DL (ref 2.4–3.5)
GLUCOSE SERPL-MCNC: 113 MG/DL (ref 80–115)
HBSAG INDEX: 0.3 S/CO (ref 0–0.99)
HGB BLD-MCNC: 9.4 G/DL (ref 12–16)
INR PPP: 3
LIPASE SERPL-CCNC: 4 U/L (ref 8–78)
LYMPHOCYTES # BLD: 0.2 THOU/UL (ref 1.2–3.4)
LYMPHOCYTES NFR BLD AUTO: 2.9 % (ref 21–51)
MCH RBC QN AUTO: 30.9 PG (ref 27–31)
MCV RBC AUTO: 95.9 FL (ref 81–99)
MONOCYTES # BLD AUTO: 0.2 THOU/UL (ref 0.11–0.59)
MONOCYTES NFR BLD AUTO: 2.9 % (ref 0–10)
NEUTROPHILS # BLD AUTO: 7 THOU/UL (ref 1.4–6.5)
NEUTROPHILS NFR BLD AUTO: 93.2 % (ref 42–75)
PLATELET # BLD AUTO: 116 THOU/UL (ref 130–400)
PLATELET BLD QL SMEAR: (no result)
POTASSIUM SERPL-SCNC: 5.6 MMOL/L (ref 3.5–5.1)
PROTHROMBIN TIME: 32.1 SEC (ref 12–14.7)
RBC # BLD AUTO: 3.04 MILL/UL (ref 4.2–5.4)
SODIUM SERPL-SCNC: 129 MMOL/L (ref 136–145)
TROPONIN I SERPL DL<=0.01 NG/ML-MCNC: 0.09 NG/ML (ref ?–0.03)
TROPONIN I SERPL DL<=0.01 NG/ML-MCNC: 0.1 NG/ML (ref ?–0.03)
TROPONIN I SERPL DL<=0.01 NG/ML-MCNC: 0.1 NG/ML (ref ?–0.03)
WBC # BLD AUTO: 7.6 THOU/UL (ref 4.8–10.8)

## 2018-02-02 PROCEDURE — 82553 CREATINE MB FRACTION: CPT

## 2018-02-02 PROCEDURE — P9047 ALBUMIN (HUMAN), 25%, 50ML: HCPCS

## 2018-02-02 PROCEDURE — 36902 INTRO CATH DIALYSIS CIRCUIT: CPT

## 2018-02-02 PROCEDURE — 36415 COLL VENOUS BLD VENIPUNCTURE: CPT

## 2018-02-02 PROCEDURE — 76080 X-RAY EXAM OF FISTULA: CPT

## 2018-02-02 PROCEDURE — 5A1D70Z PERFORMANCE OF URINARY FILTRATION, INTERMITTENT, LESS THAN 6 HOURS PER DAY: ICD-10-PCS | Performed by: INTERNAL MEDICINE

## 2018-02-02 PROCEDURE — C1769 GUIDE WIRE: HCPCS

## 2018-02-02 PROCEDURE — G0257 UNSCHED DIALYSIS ESRD PT HOS: HCPCS

## 2018-02-02 PROCEDURE — 87186 SC STD MICRODIL/AGAR DIL: CPT

## 2018-02-02 PROCEDURE — 83550 IRON BINDING TEST: CPT

## 2018-02-02 PROCEDURE — 93005 ELECTROCARDIOGRAM TRACING: CPT

## 2018-02-02 PROCEDURE — 87070 CULTURE OTHR SPECIMN AEROBIC: CPT

## 2018-02-02 PROCEDURE — 85610 PROTHROMBIN TIME: CPT

## 2018-02-02 PROCEDURE — 20501 NJX SINUS TRACT DIAGNOSTIC: CPT

## 2018-02-02 PROCEDURE — 82607 VITAMIN B-12: CPT

## 2018-02-02 PROCEDURE — 87340 HEPATITIS B SURFACE AG IA: CPT

## 2018-02-02 PROCEDURE — 80053 COMPREHEN METABOLIC PANEL: CPT

## 2018-02-02 PROCEDURE — 86850 RBC ANTIBODY SCREEN: CPT

## 2018-02-02 PROCEDURE — 96374 THER/PROPH/DIAG INJ IV PUSH: CPT

## 2018-02-02 PROCEDURE — 82746 ASSAY OF FOLIC ACID SERUM: CPT

## 2018-02-02 PROCEDURE — 87205 SMEAR GRAM STAIN: CPT

## 2018-02-02 PROCEDURE — 83540 ASSAY OF IRON: CPT

## 2018-02-02 PROCEDURE — A4216 STERILE WATER/SALINE, 10 ML: HCPCS

## 2018-02-02 PROCEDURE — 85025 COMPLETE CBC W/AUTO DIFF WBC: CPT

## 2018-02-02 PROCEDURE — 90935 HEMODIALYSIS ONE EVALUATION: CPT

## 2018-02-02 PROCEDURE — 85730 THROMBOPLASTIN TIME PARTIAL: CPT

## 2018-02-02 PROCEDURE — 86900 BLOOD TYPING SEROLOGIC ABO: CPT

## 2018-02-02 PROCEDURE — 80048 BASIC METABOLIC PNL TOTAL CA: CPT

## 2018-02-02 PROCEDURE — 83690 ASSAY OF LIPASE: CPT

## 2018-02-02 PROCEDURE — 82728 ASSAY OF FERRITIN: CPT

## 2018-02-02 PROCEDURE — 84484 ASSAY OF TROPONIN QUANT: CPT

## 2018-02-02 PROCEDURE — G0365 VESSEL MAPPING HEMO ACCESS: HCPCS

## 2018-02-02 PROCEDURE — P9016 RBC LEUKOCYTES REDUCED: HCPCS

## 2018-02-02 PROCEDURE — 86901 BLOOD TYPING SEROLOGIC RH(D): CPT

## 2018-02-02 PROCEDURE — 84100 ASSAY OF PHOSPHORUS: CPT

## 2018-02-02 PROCEDURE — 36430 TRANSFUSION BLD/BLD COMPNT: CPT

## 2018-02-02 PROCEDURE — C1725 CATH, TRANSLUMIN NON-LASER: HCPCS

## 2018-02-02 PROCEDURE — 71045 X-RAY EXAM CHEST 1 VIEW: CPT

## 2018-02-02 PROCEDURE — 87077 CULTURE AEROBIC IDENTIFY: CPT

## 2018-02-02 PROCEDURE — 93970 EXTREMITY STUDY: CPT

## 2018-02-02 PROCEDURE — 36901 INTRO CATH DIALYSIS CIRCUIT: CPT

## 2018-02-02 PROCEDURE — 83880 ASSAY OF NATRIURETIC PEPTIDE: CPT

## 2018-02-02 PROCEDURE — 36416 COLLJ CAPILLARY BLOOD SPEC: CPT

## 2018-02-02 NOTE — HP
PRIMARY CARE PHYSICIAN:  [facility].

 

CHIEF COMPLAINT:  Confusion.

 

HISTORY OF PRESENT ILLNESS:  This is a 62-year-old white female with known history of end-stage renal
 disease and recurrent cervical carcinoma.  She was seen here on 12/23/2017 with recurrent bleeding a
nd had transfusion and then was transferred to [facility] via ambulance due to contin
ued bleeding from her friable vaginal cuff.  Apparently at [facility], the patient was kept in
 the hospital until this past Monday, 01/29/2018.  In the hospital, she was diagnosed with a DVT.  Hussain tamez was also diagnosed for her recurrence cervical cancer, she was given chemotherapy which ended on Mo
nday and given radiation treatments which apparently were supposed to continue until Friday, but per 
her report, she was kicked out of the hospital on Monday, even though she had not finished her treatm
ents.  Patient reports that she did not have any physical therapy at [facility].  She reports 
that they told her that she was unwilling to cooperate, but she reports that no one would ever come t
o her room to do physical therapy.  She states she does not have any pain in her left leg from the DV
T, though she has continued swelling, but she has not been ambulating at all.  She went home and was 
not ambulatory and so did not notice her dialysis this past week and then at home, the patient was no
clary to be confused by her nephew who takes care of her, so he called an ambulance today and brought i
n.  In the emergency room, the EMS reported that the patient had been weak and lethargic.  When she g
ot to the emergency room, she was improved.  She was noted to have hyperkalemia in the emergency room
 at 5.6 as well as to be clinically volume overloaded, so she is being admitted for dialysis.  Sunshine díaz also had an elevated INR of 3.0.  The patient reports that she was given some sort of injectable an
ticoagulant from the [facility] in [place] when she was discharged, she has not noted that she is on w
arfarin, but states she is not certain what they were giving her in the hospital.

 

PAST MEDICAL HISTORY:

1.  End-stage renal disease on dialysis.

2.  Coronary artery disease with previous stent.

3.  Hypertension.

4.  Diabetes mellitus type 2, insulin-dependent.

5.  Dyslipidemia.

6.  Cervical and/or uterine cancer, status post hysterectomy and chemotherapy in 2015.

7.  Chronic anemia.

8.  Chronic lower extremity ulcers.

9.  Left lower extremity deep venous thrombosis.

 

PAST SURGICAL HISTORY:

1.  Breast reduction.

2.  Hysterectomy with oophorectomy.

3.  Appendectomy.

4.  Hernia repair.

5.  Cardiac stent placement.

 

ALLERGIES:

1.  LEVOFLOXACIN.

2.  FLAGYL.

 

MEDICATIONS:  Patient does not know her current medications, is not aware why she was discharged from
 [facility], though she does state she had injectable anticoagulant.  We will have to request
 records from [facility] for her discharge medication list.  Of note, when she was last in the [facility], she was on,

1.  Lantus 35 units in the morning and 20 units at night.

2.  Protonix 40 mg daily.

3.  Renvela 800 mg 3 times a day.

4.  Hydralazine 50 mg 3 times a day.

5.  Clonidine 0.1 mg as needed.

6.  Carvedilol 6.25 mg twice a day.

7.  Atorvastatin 20 mg at night.

8.  Amitriptyline 75 mg at night.

9.  Tramadol as needed for pain.

 

FAMILY HISTORY:  Significant for heart disease.

 

SOCIAL HISTORY:  Patient lives at home with her nephew as her primary caregiver.  No history of tobac
co, alcohol or illicit drug use.

 

REVIEW OF SYSTEMS:  Constitutional:  No fevers or chills.  She has had some generalized weakness.  Ey
es:  No double vision or blurred vision.  ENT:  No congestion, drainage or sore throat.  Pulmonary:  
No coughing, wheezing or shortness breath.  Cardiovascular:  No chest pain, no palpitations or racing
 heart.  Gastrointestinal:  No abdominal pain, no nausea or vomiting, no constipation.  She did have 
a little diarrhea in the emergency room after she was given some Kayexalate.  Genitourinary:  No furt
her vaginal bleeding.  She produces very little urine, has not had any dysuria.  Musculoskeletal:  No
 muscle aches or joint pains, no continued pain in her left lower extremity.  She has had continued s
welling in her left lower extremity that comes and goes. Skin:  No rashes or other lesions she is not
ed.  Neurologic:  No numbness, tingling or focal weakness.

 

PHYSICAL EXAMINATION:

VITAL SIGNS:  Blood pressure 117/41, pulse 89, respirations 16, and O2 sat 99% on 2 liters, temperatu
re 99.2.

GENERAL:  This is a well-developed, obese white female, in no apparent distress.

HEENT:  Pupils equal, round, and reactive to light.  Oropharynx clear without lesions, erythema or ex
udate.

NECK:  Supple, no lymphadenopathy, no thyroid nodules or enlargement.

HEART:  Regular rate and rhythm, no murmurs, rubs or gallops.

LUNGS:  Clear to auscultation bilaterally, no wheezes, crackles or rhonchi.

ABDOMEN:  Soft, nontender to palpation, normoactive bowel sounds, no hepatosplenomegaly or other mass
es.

EXTREMITIES:  No clubbing or cyanosis.  She does have 3+ pitting edema on the left lower extremity at
 the foot and the leg.  No significant tenderness, no erythema noted.

SKIN:  No rashes or lesions noted.

NEUROLOGIC:  She moves all extremities with equal strength and has no facial droop.

PSYCHIATRIC:  Alert and oriented x3, normal mood and affect.

 

LABORATORY DATA AND IMAGING:  CBC, white blood cell count 7.6, hemoglobin 9.4, hematocrit 29.2, sebastian
l MCV, platelet count 116, neutrophils 93%.  Coagulation profile with PT of 32 and INR 3.0 and aPTT o
f 58.3.  Complete metabolic panel is notable for a sodium of 129, potassium 5.6, chloride 94, bicarbo
jose 19, anion gap 22, BUN 60, creatinine 6.42 and albumin of 2.8 and total serum protein of 5.8, rem
ainder is normal.  Troponins were indeterminate at 0.088 and 0.104, which is actually lower than her 
last admission when they were elevated at 2 and 3.  Brain natriuretic peptide was 2764, which is cons
istent with her last reading in December.  Chest x-ray:  [facility]'s [facility].  There is no infiltrate, no pulmonary edema or other
 acute process visualized.  EKG:  I did review the EKG done in the emergency room, it does shows sinu
s tachycardia with some inverted T waves in the lateral leads.  No acute ST changes, no sequelae of h
yperkalemia.

 

ASSESSMENT AND PLAN:

1.  End-stage renal disease with hyperkalemia and volume overload.  Patient needs dialysis.  She has 
missed for the last 2 times during this week, so Dr. Ramirez has been consulted.  She will receive dialy
sis.  She already got Kayexalate in the emergency room.  She does not appear clinically stable at thi
s point and we have to wait for dialysis without any changes on EKG.

2.  Deep venous thrombosis diagnosed in [place] on some sort of anticoagulants, appears to be on warf
gabriella per her INR, we will get her discharge medication list from [facility] and we will continue check
ing PT/INR daily.  For now, she is sufficiently anticoagulated and can restart some warfarin in a day
 or two once the number comes down a bit.  The patient will need physical therapy to start getting he
r up and moving around.  Suspect that she may have been reluctant to ambulate with physical therapy a
t [facility].  We will see if she is able to ambulate with them here.

3.  Physical debilitation with inability to walk.  The patient will likely need skilled nursing facil
ity placement.  Apparently, there is some issue with her at [place], she had not yet finished her rad
iation therapy.  Given that she has already missed the remainder of her doses now and is no longer re
ceiving any, it should not be a problem to get her into a skilled nursing facility at this time, so t
hat she can continue physical therapy and get her dialysis regularly to prevent readmission.

4.  Recurrent cervical/uterine cancer.  The patient has finished her chemotherapy, did not miss a wee
k of her radiation therapy.  We will have her follow up with her Hematology/Oncologist in [place] or 
[place].

5.  Hypertension.  We will resume patient's antihypertensive.

6.  Diabetes mellitus type 2, insulin-dependent.  We will resume patient's Lantus and check her finge
rstick blood sugars regularly with meals.

7.  Deep venous thrombosis prophylaxis.  Patient is already on anticoagulation.

8.  Gastrointestinal prophylaxis.  We will resume patient's Protonix.

9.  Code status.  I did discuss this with the patient.  She is a FULL CODE.  Should she be incapacita
clary, she states that her nephew would be her medical power of , his name is Lee Jennifer.

## 2018-02-02 NOTE — RAD
PORTABLE CHEST 1 VIEW:

 

DATE: 2/2/18.

TIME: 1:59 a.m.

 

HISTORY: 

Dyspnea, chronic renal failure.  The patient missed his dialysis.

 

COMPARISON: 

Comparison is made with the exam of 12/24/17.

 

There has been interval placement of a right internal jugular Port-A-Cath with tip in the projection 
of the SVC.  Left subclavian vascular stent remains in place.  The left subclavian central line noted
 on the previous exam has been removed in the interim.  The heart size is mildly enlarged.  The lungs
 are expanded without confluent areas of consolidation, pneumothoraces, leslie pulmonary edema, or ple
ural effusions.

 

IMPRESSION: 

No acute process.

 

POS: [facility]

## 2018-02-02 NOTE — CON
DATE OF CONSULTATION:  02/02/2018

 

NEPHROLOGY CONSULTATION 

 

REASON FOR CONSULTATION:  Hyperkalemia.

 

HISTORY OF PRESENT ILLNESS:  This is a very pleasant 62-year-old female with a history of uterine can
cer and is status post radiation therapy, presented to the hospital after missing dialysis.  The [place]
ent was unable to ambulate and history of DVT.  The patient denies any nausea or vomiting, but has ha
d some confusion.

 

PAST MEDICAL HISTORY:  Significant for end-stage renal disease, hypertension, history of bacteremia, 
history of coronary artery disease, diabetes mellitus, obesity, lymphedema, chronic ulcer, breast red
uction, hysterectomy, appendectomy, hernia surgery, cardiac stent placement, history of uterine cance
r status post radiation and chemotherapy.

 

ALLERGIES:  Reviewed.

 

HOME MEDICATIONS:  List reviewed.

 

FAMILY HISTORY:  Negative for ESRD.

 

REVIEW OF SYSTEMS:  A 15 point review of systems was performed and was negative except for positives 
noted above.

GENERAL:  Weakness-

HEAD:  Headache-

NECK:  No swelling or lumps. 

NOSE:  No epistaxis or discharge.  

EYES:  No diplopia or pain.

RESPIRATORY:  Dyspnea-

CARDIOVASCULAR:  Chest pain-

GASTROINTESTINAL:  Nausea-

/GYN:  Hematuria-

MUSCULOSKELETAL:  No joint pain.

NEUROPSYCHIATIC SYSTEMS:  No suicidal ideation.  No ideation.

SKIN:  Denies any rash or ulcer.

CONSTITUTIONAL:   No fever or chills.

 

PHYSICAL EXAMINATION:

GENERAL:  Patient is awake, alert.

VITAL SIGNS:  Afebrile, pulse 81, breathing at 16, blood pressure 152/66.

HEAD/NECK:  Normocephalic.   Atraumatic.

EYES:  EOMI.  No deformity.

EARS:  Clear.  No ulcers.

NOSE:   Intact.  No lesions.

MOUTH:  Clear.  No discharge.

THROAT:  Clear.  No exudate.

LUNGS:   Clear.  No crackles.

CARDIAC:   S1, S2.  No rub.

ABDOMEN:   Benign.  BS+.

GENITALIA/RECTUM:  Aolnso absent.

BACK/EXTREMITIES:  Edema 0+ Ulcer-

NEUROLOGICAL:  Alert and motor intact.                     

SKIN:  Rash- Bruise-  

LYMPHATICS:  Edema- Ulcer-

 

LABORATORY DATA:  Show potassium 5.6.  Hemoglobin 9.4.

 

ASSESSMENT AND PLAN:

1.  Metabolic acidosis, plan dialysis.

2.  Stage 6 chronic kidney disease.  We will plan dialysis.

3.  Anemia, stable.

4.  Hypertension, stable.

5.  Medications based on glomerular filtration rate are appropriate.

## 2018-02-03 LAB
ANION GAP SERPL CALC-SCNC: 16 MMOL/L (ref 10–20)
BASOPHILS # BLD AUTO: 0 THOU/UL (ref 0–0.2)
BASOPHILS NFR BLD AUTO: 0.1 % (ref 0–1)
BUN SERPL-MCNC: 36 MG/DL (ref 9.8–20.1)
CALCIUM SERPL-MCNC: 7.9 MG/DL (ref 7.8–10.44)
CHLORIDE SERPL-SCNC: 98 MMOL/L (ref 98–107)
CO2 SERPL-SCNC: 24 MMOL/L (ref 23–31)
CREAT CL PREDICTED SERPL C-G-VRATE: 23 ML/MIN (ref 70–130)
EOSINOPHIL # BLD AUTO: 0.1 THOU/UL (ref 0–0.7)
EOSINOPHIL NFR BLD AUTO: 1.3 % (ref 0–10)
GLUCOSE SERPL-MCNC: 118 MG/DL (ref 80–115)
HGB BLD-MCNC: 7.6 G/DL (ref 12–16)
INR PPP: 2.2
LYMPHOCYTES # BLD: 0.3 THOU/UL (ref 1.2–3.4)
LYMPHOCYTES NFR BLD AUTO: 5.7 % (ref 21–51)
MCH RBC QN AUTO: 31.9 PG (ref 27–31)
MCV RBC AUTO: 97.9 FL (ref 81–99)
MONOCYTES # BLD AUTO: 0.3 THOU/UL (ref 0.11–0.59)
MONOCYTES NFR BLD AUTO: 5.3 % (ref 0–10)
NEUTROPHILS # BLD AUTO: 4.4 THOU/UL (ref 1.4–6.5)
NEUTROPHILS NFR BLD AUTO: 87.6 % (ref 42–75)
PLATELET # BLD AUTO: 98 THOU/UL (ref 130–400)
POTASSIUM SERPL-SCNC: 3.5 MMOL/L (ref 3.5–5.1)
PROTHROMBIN TIME: 25.1 SEC (ref 12–14.7)
RBC # BLD AUTO: 2.38 MILL/UL (ref 4.2–5.4)
SODIUM SERPL-SCNC: 134 MMOL/L (ref 136–145)
WBC # BLD AUTO: 5 THOU/UL (ref 4.8–10.8)

## 2018-02-03 PROCEDURE — 5A1D70Z PERFORMANCE OF URINARY FILTRATION, INTERMITTENT, LESS THAN 6 HOURS PER DAY: ICD-10-PCS | Performed by: INTERNAL MEDICINE

## 2018-02-03 PROCEDURE — 30233N1 TRANSFUSION OF NONAUTOLOGOUS RED BLOOD CELLS INTO PERIPHERAL VEIN, PERCUTANEOUS APPROACH: ICD-10-PCS | Performed by: HOSPITALIST

## 2018-02-03 NOTE — PDOC.PN
- Subjective


Encounter Start Date: 02/03/18


Encounter Start Time: 10:00





CC; Dyspnea





Sub: Pt says she feels better, tolerated dialysis last night





- Objective


Resuscitation Status: 


 











Resuscitation Status           FULL:Full Resuscitation














Vital Signs & Weight: 


 Vital Signs (12 hours)











  Temp Pulse Pulse Resp BP BP BP


 


 02/03/18 15:03  98.3 F   76  18    136/42 L


 


 02/03/18 14:45  97.9 F   78  18   127/37 L 


 


 02/03/18 11:33  98.5 F  78   17   


 


 02/03/18 09:20      136/62  


 


 02/03/18 08:25      116/54 L  


 


 02/03/18 07:15  97.7 F  76   18   


 


 02/03/18 04:00  99.6 F  84   16   














  BP Pulse Ox


 


 02/03/18 15:03  


 


 02/03/18 14:45  


 


 02/03/18 11:33  140/56 L  100


 


 02/03/18 09:20  


 


 02/03/18 08:25  


 


 02/03/18 07:15  132/61  99


 


 02/03/18 04:00  129/55 L  100








 Weight











Admit Weight                   246 lb 14.4 oz


 


Weight                         246 lb 14.4 oz














I&O: 


 











 02/02/18 02/03/18 02/04/18





 06:59 06:59 06:59


 


Intake Total  1200 0


 


Output Total  2800 


 


Balance  -1600 0











Result Diagrams: 


 02/03/18 05:45





 02/03/18 05:45


Additional Labs: 


 Accuchecks











  02/03/18 02/02/18 02/02/18





  05:41 20:19 16:05


 


POC Glucose  129 H  175 H  114 H














Phys Exam





- Physical Examination


Constitutional: NAD


HEENT: moist MMs


Neck: no JVD


Respiratory: no wheezing


diminished at bases, no rhonchi, no wheezing


Cardiovascular: RRR, no significant murmur, no rub


no gallop


Gastrointestinal: soft, non-tender


distended, no guarding, no rebound tenderness


positive edema


Psychiatric: A&O x 3


Skin: no rash





Dx/Plan





- Plan





* .


Pt is 62 yrs old female now admitted to hospital due to dyspnea





1. ESRD + Metabolic acidosis + Hyperkalemia : Nephro on board


tolerated hd yesterday


Repeat HD today per home schedule


K& Bicarb level improved





2. H/O DVT: INR therapeutic. Per pt taking injections for dvt as oupt.


Might be arixtra. While in hospital will start arixtra 2.5mg po bid untill pt 

found her home medication





3. HTN: Monitor bp closely


Continue home meds.





4. H/O DM type 2: Monitor blood sugars closely


continue insulin sliding scale





Case d/w pt & RN

## 2018-02-03 NOTE — PRG
DATE OF SERVICE:  02/03/2018

 

SUBJECTIVE:  This is a 62-year-old female being seen for end-stage renal disease.  The patient denies
 any nausea, vomiting or chest pain.

 

PHYSICAL EXAMINATION:

GENERAL:  Patient is awake, alert.

VITAL SIGNS:  Afebrile, pulse 76, breathing at 16, blood pressure 132/62.

HEAD/NECK:  Normocephalic.   Atraumatic.

EYES:  EOMI.  No deformity.

EARS:  Clear.  No ulcers.

NOSE:   Intact.  No lesions.

MOUTH:  Clear.  No discharge.

THROAT:  Clear.  No exudate.

LUNGS:   Clear.  No crackles.

CARDIAC:   S1, S2.  No rub.

ABDOMEN:   Benign.  BS+.

GENITALIA/RECTUM:  Alonso absent.

BACK/EXTREMITIES:  Edema 0+ Ulcer-

NEUROLOGICAL:  Alert and motor intact.                     

SKIN:  Rash- Bruise-  

LYMPHATICS:  Edema- Ulcer-

 

LABORATORY DATA:  Show hemoglobin 7.6.

 

ASSESSMENT AND RECOMMENDATIONS:

1.  Stage 6 chronic kidney disease, continue hemodialysis.

2.  Hypertension, stable.

3.  Anemia.  Plan transfusion with dialysis.

## 2018-02-03 NOTE — EKG
Test Reason : MISSED DIALYSIS

Blood Pressure : ***/*** mmHG

Vent. Rate : 111 BPM     Atrial Rate : 111 BPM

   P-R Int : 166 ms          QRS Dur : 096 ms

    QT Int : 324 ms       P-R-T Axes : 070 055 153 degrees

   QTc Int : 440 ms

 

Sinus tachycardia

T wave abnormality, consider inferolateral ischemia

Abnormal ECG

 

Confirmed by CAREY WHEAT, JOCE ([id number]),  DEMETRIUS CERON ([id number]) on 2/3/2018 12:10:34 PM

 

Referred By:  MARIE           Confirmed By:JOCE PATINO MD

## 2018-02-04 LAB
INR PPP: 1.5
PROTHROMBIN TIME: 18.9 SEC (ref 12–14.7)

## 2018-02-04 RX ADMIN — HYDROCODONE BITARTRATE AND ACETAMINOPHEN PRN TAB: 5; 325 TABLET ORAL at 11:45

## 2018-02-04 RX ADMIN — HYDROCODONE BITARTRATE AND ACETAMINOPHEN PRN TAB: 5; 325 TABLET ORAL at 21:38

## 2018-02-04 NOTE — PDOC.PN
- Subjective


Encounter Start Date: 02/04/18


Encounter Start Time: 14:31





CC; Dyspnea





Sub: Pt denies dyspnea 





- Objective


Resuscitation Status: 


 











Resuscitation Status           FULL:Full Resuscitation














Vital Signs & Weight: 


 Vital Signs (12 hours)











  Temp Pulse Resp BP BP Pulse Ox


 


 02/04/18 11:16  98.0 F  76  16   130/57 L  98


 


 02/04/18 09:43   78   127/59 L  


 


 02/04/18 09:42     127/59 L  


 


 02/04/18 09:37  99.9 F H  78  18   127/59 L  97


 


 02/04/18 08:00  99.9 F H  78  18    97


 


 02/04/18 04:45       99


 


 02/04/18 04:00  97.8 F  77  15   134/60  99








 Weight











Admit Weight                   246 lb 14.4 oz


 


Weight                         225 lb 9.6 oz














I&O: 


 











 02/03/18 02/04/18 02/05/18





 06:59 06:59 06:59


 


Intake Total 1200 1550 


 


Output Total 2800 3150 


 


Balance -1600 -1600 











Result Diagrams: 


 02/03/18 05:45





 02/03/18 05:45


Additional Labs: 


 Accuchecks











  02/04/18 02/04/18 02/03/18





  11:21 05:45 19:48


 


POC Glucose  112 H  125 H  248 H














  02/03/18 02/03/18





  18:15 11:31


 


POC Glucose  281 H  201 H














Dx/Plan





- Plan








Physical Examination


Constitutional: NAD, lying on bed


HEENT: moist MMs


Neck: no JVD


Respiratory: no wheezingdiminished at bases, no rhonchi, no wheezing


Cardiovascular: RRR, no significant murmur, no rub


no gallop


Gastrointestinal: soft, non-tender


distended, no guarding, no rebound tenderness


positive edema


Psychiatric: A&O x 3


Skin: no rash





Dx/Plan





Pt is 62 yrs old female now admitted to hospital due to dyspnea





1. ESRD + Metabolic acidosis + Hyperkalemia : Nephro on board


tolerated hd yesterday


next hd on tuesday


K& Bicarb level improved





2. H/O DVT: INR therapeutic. Per pt taking injections for dvt as oupt.


Might be arixtra. continue eliquis 2.5mg po bid





3. HTN: Monitor bp closely


Continue home meds.





4. H/O DM type 2: Monitor blood sugars closely


continue insulin sliding scale





5. DISPO: Might need SNF. Will consult PT/OT to evaluate patient





Case d/w pt & RN

## 2018-02-04 NOTE — PRG
DATE OF SERVICE:  02/04/2018

 

SUBJECTIVE:  This 62-year-old female being seen for end-stage renal disease.  The patient denies any 
nausea, vomiting or chest pain.

 

PHYSICAL EXAMINATION:

GENERAL:  Patient is awake, alert.

VITAL SIGNS:  Afebrile, pulse 70, breathing at 16, blood pressure 130/57.

HEAD/NECK:  Normocephalic.   Atraumatic.

EYES:  EOMI.  No deformity.

EARS:  Clear.  No ulcers.

NOSE:   Intact.  No lesions.

MOUTH:  Clear.  No discharge.

THROAT:  Clear.  No exudate.

LUNGS:   Clear.  No crackles.

CARDIAC:   S1, S2.  No rub.

ABDOMEN:   Benign.  BS+.

GENITALIA/RECTUM:  Alonso absent.

BACK/EXTREMITIES:  Edema 0+ Ulcer-

NEUROLOGICAL:  Alert and motor intact.                     

SKIN:  Rash- Bruise-  

LYMPHATICS:  Edema- Ulcer-

 

LABORATORY DATA:  Show hemoglobin is pending.

 

ASSESSMENT AND RECOMMENDATIONS:

1.  Stage 6 chronic kidney disease.  Continue hemodialysis.

2.  Hypertension, stable.

3.  Anemia, status post transfusion.  Continue Epogen.

4.  Medications based on glomerular filtration rate are appropriate.

## 2018-02-05 LAB
ANION GAP SERPL CALC-SCNC: 12 MMOL/L (ref 10–20)
BASOPHILS # BLD AUTO: 0 THOU/UL (ref 0–0.2)
BASOPHILS NFR BLD AUTO: 0 % (ref 0–1)
BUN SERPL-MCNC: 27 MG/DL (ref 9.8–20.1)
CALCIUM SERPL-MCNC: 8.3 MG/DL (ref 7.8–10.44)
CHLORIDE SERPL-SCNC: 95 MMOL/L (ref 98–107)
CO2 SERPL-SCNC: 26 MMOL/L (ref 23–31)
CREAT CL PREDICTED SERPL C-G-VRATE: 24 ML/MIN (ref 70–130)
EOSINOPHIL # BLD AUTO: 0.1 THOU/UL (ref 0–0.7)
EOSINOPHIL NFR BLD AUTO: 1.2 % (ref 0–10)
GLUCOSE SERPL-MCNC: 53 MG/DL (ref 80–115)
HGB BLD-MCNC: 9.5 G/DL (ref 12–16)
INR PPP: 1.8
LYMPHOCYTES # BLD: 0.4 THOU/UL (ref 1.2–3.4)
LYMPHOCYTES NFR BLD AUTO: 6.5 % (ref 21–51)
MCH RBC QN AUTO: 31.6 PG (ref 27–31)
MCV RBC AUTO: 95.1 FL (ref 81–99)
MONOCYTES # BLD AUTO: 0.2 THOU/UL (ref 0.11–0.59)
MONOCYTES NFR BLD AUTO: 3.9 % (ref 0–10)
NEUTROPHILS # BLD AUTO: 5.1 THOU/UL (ref 1.4–6.5)
NEUTROPHILS NFR BLD AUTO: 88.4 % (ref 42–75)
PLATELET # BLD AUTO: 118 THOU/UL (ref 130–400)
POTASSIUM SERPL-SCNC: 3.2 MMOL/L (ref 3.5–5.1)
PROTHROMBIN TIME: 21.6 SEC (ref 12–14.7)
RBC # BLD AUTO: 3.01 MILL/UL (ref 4.2–5.4)
SODIUM SERPL-SCNC: 130 MMOL/L (ref 136–145)
WBC # BLD AUTO: 5.8 THOU/UL (ref 4.8–10.8)

## 2018-02-05 PROCEDURE — 5A1D70Z PERFORMANCE OF URINARY FILTRATION, INTERMITTENT, LESS THAN 6 HOURS PER DAY: ICD-10-PCS | Performed by: INTERNAL MEDICINE

## 2018-02-05 RX ADMIN — HYDROCODONE BITARTRATE AND ACETAMINOPHEN PRN TAB: 5; 325 TABLET ORAL at 20:33

## 2018-02-05 RX ADMIN — HYDROCODONE BITARTRATE AND ACETAMINOPHEN PRN TAB: 5; 325 TABLET ORAL at 06:37

## 2018-02-05 RX ADMIN — HYDROCODONE BITARTRATE AND ACETAMINOPHEN PRN TAB: 5; 325 TABLET ORAL at 14:38

## 2018-02-05 NOTE — PDOC.PN
- Subjective


Encounter Start Date: 02/05/18


Encounter Start Time: 15:37


Subjective: Reports no complaints. No dizziness. 


-: No acute events overnight. 





- Objective


Resuscitation Status: 


 











Resuscitation Status           FULL:Full Resuscitation














MAR Reviewed: Yes


Vital Signs & Weight: 


 Vital Signs (12 hours)











  Temp Pulse Pulse Pulse Resp BP BP


 


 02/05/18 11:12  98.4 F  69    15  


 


 02/05/18 09:16   84     136/62 


 


 02/05/18 09:10  98.1 F  84    16  


 


 02/05/18 08:50    84     136/62


 


 02/05/18 08:23  98.1 F  102 H    18  


 


 02/05/18 08:17    86  90    125/70


 


 02/05/18 04:00  98.2 F  72    18  














  BP BP BP Pulse Ox Pulse Ox Pulse Ox


 


 02/05/18 11:12   131/59 L   98  


 


 02/05/18 09:16      


 


 02/05/18 09:10    136/62  97  


 


 02/05/18 08:50      


 


 02/05/18 08:23   164/80 H   97  


 


 02/05/18 08:17  138/64     99  99


 


 02/05/18 04:00   129/59 L   99  








 Weight











Admit Weight                   246 lb 14.4 oz


 


Weight                         227 lb 1 oz














I&O: 


 











 02/04/18 02/05/18 02/06/18





 06:59 06:59 06:59


 


Intake Total 1550 1110 


 


Output Total 3150  


 


Balance -1600 1110 











Result Diagrams: 


 02/05/18 04:40





 02/05/18 04:40


Additional Labs: 


 Accuchecks











  02/05/18 02/05/18 02/05/18





  11:27 05:47 01:55


 


POC Glucose  71  67 L  93














  02/04/18 02/04/18 02/04/18





  20:08 17:47 17:34


 


POC Glucose  86  160 H  41 L*














Phys Exam





- Physical Examination


Constitutional: NAD


HEENT: PERRLA, moist MMs, sclera anicteric


Neck: supple, full ROM


Respiratory: no wheezing, no rales, no rhonchi, clear to auscultation bilateral


Cardiovascular: RRR, no significant murmur, no rub


Gastrointestinal: soft, non-tender, no distention, positive bowel sounds


Musculoskeletal: pulses present, edema present (worse on LLE (old DVT site))


Neurological: non-focal, normal sensation


Psychiatric: normal affect, A&O x 3


Skin: no rash, normal turgor





Dx/Plan


(1) ESRD (end stage renal disease) on dialysis


Code(s): N18.6 - END STAGE RENAL DISEASE; Z99.2 - DEPENDENCE ON RENAL DIALYSIS 

  Status: Chronic   


Plan: 


Continue current management


Comment: On TTS schedule. Missed some sessions before this admission   





(2) Hyperkalemia


Code(s): E87.5 - HYPERKALEMIA   Status: Resolved   


Plan: 


Monitor. 


2/2/ ESRD. 











(3) Metabolic acidosis


Code(s): E87.2 - ACIDOSIS   Status: Resolved   Comment: 2/2 ESRD. HD per 

nephrology.    





(4) History of DVT (deep vein thrombosis)


Code(s): Z86.718 - PERSONAL HISTORY OF OTHER VENOUS THROMBOSIS AND EMBOLISM   

Status: Acute   


Plan: 


Continue current management


Comment: On AC w Eliquis. 


   





(5) Coronary artery disease


Code(s): I25.10 - ATHSCL HEART DISEASE OF NATIVE CORONARY ARTERY W/O ANG PCTRS 

  Status: Chronic   


Qualifiers: 


   Coronary Disease-Associated Artery/Lesion type: unspecified vessel or lesion 

type   Native vs. transplanted heart: native heart   Associated angina: without 

angina   Qualified Code(s): I25.10 - Atherosclerotic heart disease of native 

coronary artery without angina pectoris   


Comment: Stable, chest pain free. 


COntinue home regimen. 


   





(6) Hypertension


Code(s): I10 - ESSENTIAL (PRIMARY) HYPERTENSION   Status: Chronic   


Qualifiers: 


   Hypertension type: essential hypertension   Qualified Code(s): I10 - 

Essential (primary) hypertension   


Plan: 


Continue current management


Comment: Fairly well controlled. 


   





(7) Diabetes mellitus


Code(s): E11.9 - TYPE 2 DIABETES MELLITUS WITHOUT COMPLICATIONS   Status: 

Chronic   


Qualifiers: 


   Diabetes mellitus complication status: with kidney complications   Diabetes 

mellitus complication detail: with chronic kidney disease   Chronic kidney 

disease stage: on chronic dialysis 


Comment: on SSI


Long acting insulin discontinued 2/2 hypoglycemia. 


   





(8) Hypoglycemia


Code(s): E16.2 - HYPOGLYCEMIA, UNSPECIFIED   Status: Resolved   Comment: Monitor


Hold long acting insulin


Start SSI   





(9) Physical deconditioning


Code(s): R53.81 - OTHER MALAISE   Status: Acute   Comment: Likely 2/2 prolonged 

hospitalization.


Will need NICKY placement.    





- Plan


cont current plan of care, PT/OT, 





* .

## 2018-02-05 NOTE — PRG
DATE OF SERVICE:  02/05/2018

 

SUBJECTIVE:  Patient was seen and examined at bedside and overnight events noted.  Patient denies any
 shortness of breath or chest pain or palpitation.  No history of nausea or vomiting or diarrhea or f
ever or chills or cramps.

 

OBJECTIVE:

GENERAL:  This is an obese female in no apparent distress.

VITAL SIGNS:  Temperature 98, pulse 85, respirations 18, and blood pressure 136/62.

HEENT:  Atraumatic, normocephalic.  Oral mucosa is moist.

NECK:  Supple. 

CARDIOVASCULAR:  S1, S2 heard.  Rate and rhythm regular.

RESPIRATORY:  Clear to auscultation.

GASTROINTESTINAL:  Abdomen is soft.

MUSCULOSKELETAL:  No tenderness.  No edema.

DERMATOLOGIC : No skin rash.

NEUROLOGIC:  Alert and awake and oriented x3.  No focal neurologic deficits.  Moving all the extremit
ies.

PSYCHIATRIC:  Mood and affect normal

 

LABORATORY DATA:  Potassium is 3.8, BUN 27, creatinine 3.9.

 

ASSESSMENT AND PLAN:

1.  End-stage renal disease, currently on hemodialysis.

2.  Hypertension.

3.  Edema, we will give extra dialysis.

4.  Cardiorenal syndrome.

5.  Anemia.

 

Plan is to have excess session of dialysis for 3 hours today.

## 2018-02-06 LAB
ANION GAP SERPL CALC-SCNC: 10 MMOL/L (ref 10–20)
BASOPHILS # BLD AUTO: 0 THOU/UL (ref 0–0.2)
BASOPHILS NFR BLD AUTO: 0 % (ref 0–1)
BUN SERPL-MCNC: 19 MG/DL (ref 9.8–20.1)
CALCIUM SERPL-MCNC: 8.2 MG/DL (ref 7.8–10.44)
CHLORIDE SERPL-SCNC: 97 MMOL/L (ref 98–107)
CO2 SERPL-SCNC: 29 MMOL/L (ref 23–31)
CREAT CL PREDICTED SERPL C-G-VRATE: 32 ML/MIN (ref 70–130)
EOSINOPHIL # BLD AUTO: 0.1 THOU/UL (ref 0–0.7)
EOSINOPHIL NFR BLD AUTO: 2.1 % (ref 0–10)
GLUCOSE SERPL-MCNC: 127 MG/DL (ref 80–115)
HGB BLD-MCNC: 9 G/DL (ref 12–16)
INR PPP: 2
LYMPHOCYTES # BLD: 0.5 THOU/UL (ref 1.2–3.4)
LYMPHOCYTES NFR BLD AUTO: 7.5 % (ref 21–51)
MCH RBC QN AUTO: 31.6 PG (ref 27–31)
MCV RBC AUTO: 94.7 FL (ref 81–99)
MONOCYTES # BLD AUTO: 0.3 THOU/UL (ref 0.11–0.59)
MONOCYTES NFR BLD AUTO: 4.7 % (ref 0–10)
NEUTROPHILS # BLD AUTO: 5.2 THOU/UL (ref 1.4–6.5)
NEUTROPHILS NFR BLD AUTO: 85.7 % (ref 42–75)
PLATELET # BLD AUTO: 111 THOU/UL (ref 130–400)
POTASSIUM SERPL-SCNC: 3.5 MMOL/L (ref 3.5–5.1)
PROTHROMBIN TIME: 23 SEC (ref 12–14.7)
RBC # BLD AUTO: 2.83 MILL/UL (ref 4.2–5.4)
SODIUM SERPL-SCNC: 132 MMOL/L (ref 136–145)
WBC # BLD AUTO: 6.1 THOU/UL (ref 4.8–10.8)

## 2018-02-06 PROCEDURE — 5A1D70Z PERFORMANCE OF URINARY FILTRATION, INTERMITTENT, LESS THAN 6 HOURS PER DAY: ICD-10-PCS | Performed by: INTERNAL MEDICINE

## 2018-02-06 RX ADMIN — HYDROCODONE BITARTRATE AND ACETAMINOPHEN PRN TAB: 5; 325 TABLET ORAL at 19:58

## 2018-02-06 RX ADMIN — INSULIN LISPRO PRN UNIT: 100 INJECTION, SOLUTION INTRAVENOUS; SUBCUTANEOUS at 12:41

## 2018-02-06 RX ADMIN — INSULIN LISPRO PRN UNIT: 100 INJECTION, SOLUTION INTRAVENOUS; SUBCUTANEOUS at 20:00

## 2018-02-06 RX ADMIN — ERYTHROPOIETIN SCH UNITS: 20000 INJECTION, SOLUTION INTRAVENOUS; SUBCUTANEOUS at 17:07

## 2018-02-06 NOTE — PDOC.PN
- Subjective


Encounter Start Date: 02/06/18


Encounter Start Time: 11:14


Subjective: No new complaints. 


-: No acue events overnight. 


-: Gen surgery eviewed. Pt to have debridement of sacral decubitus ulcer





- Objective


Resuscitation Status: 


 











Resuscitation Status           FULL:Full Resuscitation














MAR Reviewed: Yes


Vital Signs & Weight: 


 Vital Signs (12 hours)











  Temp Pulse Resp BP BP Pulse Ox


 


 02/06/18 08:36   73    


 


 02/06/18 08:31     119/63  


 


 02/06/18 08:25  97.7 F  73  16   110/67  97


 


 02/06/18 08:00  99.2 F  90  16   119/63  93 L


 


 02/06/18 07:22  98.3 F  85  20   


 


 02/06/18 04:00  98.3 F  85  20   144/65 H  95








 Weight











Admit Weight                   246 lb 14.4 oz


 


Weight                         227 lb 1 oz














I&O: 


 











 02/05/18 02/06/18 02/07/18





 06:59 06:59 06:59


 


Intake Total 1110 400 


 


Output Total  2000 


 


Balance 1110 -1600 











Result Diagrams: 


 02/06/18 03:41





 02/06/18 03:41


Additional Labs: 


 Accuchecks











  02/05/18 02/05/18 02/05/18





  20:08 18:19 11:27


 


POC Glucose  146 H  121 H  71














Phys Exam





- Physical Examination


Constitutional: NAD


HEENT: PERRLA, moist MMs, sclera anicteric


Neck: supple, full ROM


Respiratory: no wheezing, no rales, no rhonchi, clear to auscultation bilateral


Cardiovascular: RRR, no significant murmur, no rub


Gastrointestinal: soft, non-tender, no distention, positive bowel sounds


Obese abdomen


Musculoskeletal: no edema, pulses present


Neurological: non-focal, moves all 4 limbs


Psychiatric: normal affect, A&O x 3


Skin: no rash, normal turgor


Deviation from normal: + Sacral decubitus ulcers (pesent on admission)





Dx/Plan


(1) ESRD (end stage renal disease) on dialysis


Code(s): N18.6 - END STAGE RENAL DISEASE; Z99.2 - DEPENDENCE ON RENAL DIALYSIS 

  Status: Chronic   


Plan: 


Improving with HD.


Continue current mx


HD per nephrology. 





Comment: On TTS schedule. Missed at leas 2 sessions before this admission   





(2) History of DVT (deep vein thrombosis)


Code(s): Z86.718 - PERSONAL HISTORY OF OTHER VENOUS THROMBOSIS AND EMBOLISM   

Status: Acute   


Plan: 


AC being held for planned debridement. 





Comment: On AC w Eliquis. 


   





(3) Coronary artery disease


Code(s): I25.10 - ATHSCL HEART DISEASE OF NATIVE CORONARY ARTERY W/O ANG PCTRS 

  Status: Chronic   


Qualifiers: 


   Coronary Disease-Associated Artery/Lesion type: unspecified vessel or lesion 

type   Native vs. transplanted heart: native heart   Associated angina: without 

angina   Qualified Code(s): I25.10 - Atherosclerotic heart disease of native 

coronary artery without angina pectoris   


Plan: 


Continue current mx


Comment: Stable, chest pain free. 


COntinue home regimen. 


   





(4) Hypertension


Code(s): I10 - ESSENTIAL (PRIMARY) HYPERTENSION   Status: Chronic   


Qualifiers: 


   Hypertension type: essential hypertension   Qualified Code(s): I10 - 

Essential (primary) hypertension   


Plan: 


Continue current mx


Comment: At goal. 





   





(5) Diabetes mellitus


Code(s): E11.9 - TYPE 2 DIABETES MELLITUS WITHOUT COMPLICATIONS   Status: 

Chronic   


Qualifiers: 


   Diabetes mellitus complication status: with kidney complications   Diabetes 

mellitus complication detail: with chronic kidney disease   Chronic kidney 

disease stage: on chronic dialysis 


Plan: 


IMpoving blood glucose levels. Continue mx. 


Comment: on SSI


Long acting insulin discontinued 2/2 hypoglycemia. 


   





(6) Hypoglycemia


Code(s): E16.2 - HYPOGLYCEMIA, UNSPECIFIED   Status: Resolved   Comment: 

Improving. 


Long acting insulin being held. 


Continue SSI   





(7) Physical deconditioning


Code(s): R53.81 - OTHER MALAISE   Status: Acute   Comment: Likely 2/2 prolonged 

hospitalization.


Will need NICKY placement.    





(8) Sacral decubitus ulcer


Status: Acute   


Qualifiers: 


   Pressure ulcer stage: unstageable   Qualified Code(s): L89.150 - Pressure 

ulcer of sacral region, unstageable   


Comment: Unstageable 2/2 necrotic tissue. 


Gen Surgery evaluated. Will be taken to OR for debridement. 


   





- Plan


cont current plan of care, PT/OT





* .

## 2018-02-06 NOTE — PRG
DATE OF SERVICE:  02/06/2018

 

SUBJECTIVE:  Patient was seen and examined at bedside and overnight events 
noted.  Patient denies any shortness of breath or chest pain or palpitation.  
No history of nausea or vomiting or diarrhea or fever or chills or cramps. 

 

OBJECTIVE:

GENERAL:  This is an obese female, in no apparent distress.

VITAL SIGNS:  Temperature 99.9, pulse 87, respiratory rate 18, blood pressure 
140/67.

HEENT: Atraumatic, normocephalic, Oral mucosa is moist.

NECK: Supple.

CARDIOVASCULAR:  S1 and S2 heard.  Rate and rhythm regular.

RESPIRATORY:  Clear to auscultation.

GASTROINTESTINAL:  Abdomen is soft.

MUSCULOSKELETAL:  No tenderness, no edema.

DERMATOLOGIC:  No skin rash.

NEUROLOGIC:  Alert and awake and oriented x3.  No focal neurologic deficits.  
Moving all the extremities.

PSYCHIATRIC:  Mood and affect normal.

 

LABORATORY DATA:  Potassium is 3.5, BUN 19, creatinine is 2.9.

 

ASSESSMENT AND PLAN:

1.  End-stage renal disease.  Continue dialysis Tuesday, Thursday, and Saturday 
as tolerated.

2.  Hyperkalemia, better.

3.  Edema.  continue dialysis.

4.  Obesity.

5.  Hypertension.

6.  Anemia.  Continue Epogen.

 

The plan is to continue on dialysis as tolerated.

 

MTDD

## 2018-02-06 NOTE — CON
DATE OF CONSULTATION:  02/05/2018

 

HISTORY OF PRESENT ILLNESS:  Jocelyn Padilla is a 62-year-old female with end-stage renal disease, o
n maintenance dialysis, using her left upper arm basilic vein transposition fistula that I placed yea
rs ago.  The patient presented to this institution with vaginal bleeding and was found to have endome
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
enocarcinoma with clear cells.  The patient was sent to [facility] and underwent radiation and chemo
therapy recently.  She did not finish her radiation therapy for some unknown reason.  She has not had
 any vaginal bleeding since that time.  She states, at that institution, she was not out of bed much 
and she developed a sacral decubitus and was admitted 02/02/2018 for episode of confusion, hyperkalem
ia, volume overloaded because of missing 2 dialysis episodes.  She has been admitted for Kayexalate a
nd dialysis.  She is on Eliquis for DVT, diagnosed in [place], right leg.  She is on Eliquis; dose gi
danay 02/05/2018.  I have been consulted regarding her sacral decubitus.

 

Her sacral decubitus has necrotic tissue and is in need of debridement.  We will plan this debridemen
t in the operating room on Wednesday 02/07/2018.  We will hold her Eliquis.

 

ALLERGIES:  LEVOFLOXACIN and METRONIDAZOLE.

 

PAST MEDICAL HISTORY:  Anxiety; chronic anemia; dyslipidemia; depression; diabetes mellitus, type 2, 
insulin-dependent; morbid obesity; metabolic syndrome; hypertension; end-stage renal disease, [facility] dialysis Monday, Wednesday, and Friday, [facility], followed by Dr. Ramirez; history o
f coronary artery disease; myocardial infarction in 2008, stent placed and followed up with her cardi
ologist in [place], now seeing Dr. Jamison; uterine cancer, status post radiation and status pos
t hysterectomy and chemotherapy in 2015.  History of deep venous thrombosis, on Eliquis.

 

PAST SURGICAL HISTORY:  Appendectomy 13 years of age; colonoscopy in the past; coronary artery stents
, myocardial infarction in 2008; breast reduction, 17 years ago, 04/11/2014; laparoscopic peritoneal 
dialysis catheter, double-cuffed pigtail, extension set of left upper quadrant; open umbilical hernia
 repair with 6.4 cm PVP mesh; left arm primary fistula, proximal radial artery, outflow perforating b
ranch antecubital vein and outflow basilic and cephalic vein, 3.5 mm coronary dilator; 07/18/2014, ri
ght internal jugular vein cuffed-tunneled hemodialysis catheter, left arm basilic vein transposition 
fistula; 07/22/2014, removal of peritoneal dialysis catheter; 12/04/2015, Dr. Nils Stephens, EGD and col
onoscopies; 12/05/2015, Dr. Jama, completion colonoscopy with small ascending colon polyp, noting sig
moid diverticulosis, ulcerations distal rectum consistent with stercoral ulcers; 01/12/2016, removal 
of right IJ cuffed-tunnel dialysis catheter; 12/24/2017, left subclavian vein triple-lumen catheter p
laced for anemia, vaginal bleeding, end-stage renal disease.

 

MEDICATIONS:  At home, tramadol, Catapres 0.1 mg p.r.n., Renvela 800 mg t.i.d. with meals, MiraLax 17
 grams daily p.r.n., Protonix 40 mg q.24 hours, nitroglycerin p.r.n., insulin 35 units a.m. and 20 un
its p.m., carvedilol 6.25 mg b.i.d., calcium carbonate, Tums 1000 mg q.4 hours p.r.n., Lipitor 20 mg 
at bedtime, Elavil 75 mg at bedtime, Tylenol p.r.n. pain.  In the hospital, Eliquis has been continue
d, but held by the time of this dictation.

 

SOCIAL HISTORY:  Patient lives at home with her nephew in the [facility].  Tobacco:  None.  Alcohol
:  None.

 

PHYSICAL EXAMINATION:

VITAL SIGNS:  5 feet tall, 227 pounds, 44 BMI, 98.3, 85, 20, 144/65.

HEAD, EYES, EARS, NOSE, AND THROAT:  Unremarkable.

LUNGS:  Clear to auscultation.

CARDIAC:  Regular rate and rhythm without murmur, rub, or gallop.

ABDOMEN:  Soft, obese.  Left upper arm basilic vein transposition fistula, good thrill and bruits.

BACK:  Sacral decubitus for 4 x 6 cm area of eschar.  In need of debridement.

 

ASSESSMENT AND PLAN:

1.  Sacral decubitus present on this admission.  We would recommend debridement in the operating room
.  We will hold her Eliquis.

2.  Deep venous thrombosis, on Eliquis.

3.  History of endometrial cancer, status post hysterectomy and radiation and chemotherapy.

4.  End-stage renal disease, dialyzing in [facility], followed by Dr. Ramirez.

5.  Coronary artery disease, stable.

 

Note this patient was seen and examined 02/05/2018 and date of dictation is 02/06/2018.

## 2018-02-07 LAB
ANION GAP SERPL CALC-SCNC: 12 MMOL/L (ref 10–20)
BASOPHILS # BLD AUTO: 0 THOU/UL (ref 0–0.2)
BASOPHILS NFR BLD AUTO: 0.1 % (ref 0–1)
BUN SERPL-MCNC: 13 MG/DL (ref 9.8–20.1)
CALCIUM SERPL-MCNC: 8 MG/DL (ref 7.8–10.44)
CHLORIDE SERPL-SCNC: 97 MMOL/L (ref 98–107)
CO2 SERPL-SCNC: 29 MMOL/L (ref 23–31)
CREAT CL PREDICTED SERPL C-G-VRATE: 42 ML/MIN (ref 70–130)
EOSINOPHIL # BLD AUTO: 0.1 THOU/UL (ref 0–0.7)
EOSINOPHIL NFR BLD AUTO: 1.1 % (ref 0–10)
GLUCOSE SERPL-MCNC: 153 MG/DL (ref 80–115)
HGB BLD-MCNC: 8.6 G/DL (ref 12–16)
INR PPP: 2.1
LYMPHOCYTES # BLD: 0.5 THOU/UL (ref 1.2–3.4)
LYMPHOCYTES NFR BLD AUTO: 9.9 % (ref 21–51)
MCH RBC QN AUTO: 30.6 PG (ref 27–31)
MCV RBC AUTO: 95.2 FL (ref 81–99)
MONOCYTES # BLD AUTO: 0.4 THOU/UL (ref 0.11–0.59)
MONOCYTES NFR BLD AUTO: 7.2 % (ref 0–10)
NEUTROPHILS # BLD AUTO: 4.4 THOU/UL (ref 1.4–6.5)
NEUTROPHILS NFR BLD AUTO: 81.7 % (ref 42–75)
PLATELET # BLD AUTO: 120 THOU/UL (ref 130–400)
POTASSIUM SERPL-SCNC: 3.8 MMOL/L (ref 3.5–5.1)
PROTHROMBIN TIME: 24 SEC (ref 12–14.7)
RBC # BLD AUTO: 2.8 MILL/UL (ref 4.2–5.4)
SODIUM SERPL-SCNC: 134 MMOL/L (ref 136–145)
WBC # BLD AUTO: 5.4 THOU/UL (ref 4.8–10.8)

## 2018-02-07 PROCEDURE — 0KDN0ZZ EXTRACTION OF RIGHT HIP MUSCLE, OPEN APPROACH: ICD-10-PCS | Performed by: SURGERY

## 2018-02-07 NOTE — PRG
DATE OF SERVICE:  02/07/2018

 

SUBJECTIVE:  Patient was seen and examined at bedside and overnight events noted.  Patient denies any
 shortness of breath or chest pain or palpitation.  No history of nausea or vomiting or diarrhea or f
ever or chills or cramps.

 

OBJECTIVE:

GENERAL:  This is a well-built female, in no apparent distress.

VITAL SIGNS:  Temperature 99.2, pulse 93, respirations 18, blood pressure 155/67.

HEENT:  Atraumatic, normocephalic.  Oral mucosa is moist.

NECK:  Supple.

CARDIOVASCULAR:  S1, S2 heard.  Rate and rhythm regular.

RESPIRATORY:  Clear to auscultation.

GASTROINTESTINAL:  Abdomen is soft.

MUSCULOSKELETAL:  No tenderness, no edema.

DERMATOLOGIC:  No skin rash.

NEUROLOGIC:  Alert and awake and oriented x3.  No focal neurologic deficits.  Moving all the extremit
ies.

PSYCHIATRIC:  Mood and affect normal.

 

LABORATORY DATA:  Potassium is 3.8, BUN is 13, creatinine is 2.2.

 

ASSESSMENT AND PLAN:

1.  End-stage renal disease, continue on hemodialysis

2.  Hypertension, stable.

3.  Edema, controlled.

4.  Anemia.  Continue OPAL with dialysis.

5.  We will continue on dialysis Tuesday, Thursday, Saturday.

## 2018-02-08 LAB
ANION GAP SERPL CALC-SCNC: 11 MMOL/L (ref 10–20)
BASOPHILS # BLD AUTO: 0 THOU/UL (ref 0–0.2)
BASOPHILS NFR BLD AUTO: 0.4 % (ref 0–1)
BUN SERPL-MCNC: 23 MG/DL (ref 9.8–20.1)
CALCIUM SERPL-MCNC: 7.7 MG/DL (ref 7.8–10.44)
CHLORIDE SERPL-SCNC: 96 MMOL/L (ref 98–107)
CO2 SERPL-SCNC: 29 MMOL/L (ref 23–31)
CREAT CL PREDICTED SERPL C-G-VRATE: 31 ML/MIN (ref 70–130)
EOSINOPHIL # BLD AUTO: 0 THOU/UL (ref 0–0.7)
EOSINOPHIL NFR BLD AUTO: 0.9 % (ref 0–10)
GLUCOSE SERPL-MCNC: 224 MG/DL (ref 80–115)
HGB BLD-MCNC: 7.5 G/DL (ref 12–16)
INR PPP: 2.1
LYMPHOCYTES # BLD: 0.6 THOU/UL (ref 1.2–3.4)
LYMPHOCYTES NFR BLD AUTO: 13.7 % (ref 21–51)
MCH RBC QN AUTO: 31.1 PG (ref 27–31)
MCV RBC AUTO: 96.8 FL (ref 81–99)
MONOCYTES # BLD AUTO: 0.4 THOU/UL (ref 0.11–0.59)
MONOCYTES NFR BLD AUTO: 8.5 % (ref 0–10)
NEUTROPHILS # BLD AUTO: 3.4 THOU/UL (ref 1.4–6.5)
NEUTROPHILS NFR BLD AUTO: 76.5 % (ref 42–75)
PLATELET # BLD AUTO: 123 THOU/UL (ref 130–400)
POTASSIUM SERPL-SCNC: 3.5 MMOL/L (ref 3.5–5.1)
PROTHROMBIN TIME: 24.2 SEC (ref 12–14.7)
RBC # BLD AUTO: 2.41 MILL/UL (ref 4.2–5.4)
SODIUM SERPL-SCNC: 132 MMOL/L (ref 136–145)
WBC # BLD AUTO: 4.4 THOU/UL (ref 4.8–10.8)

## 2018-02-08 PROCEDURE — 5A1D70Z PERFORMANCE OF URINARY FILTRATION, INTERMITTENT, LESS THAN 6 HOURS PER DAY: ICD-10-PCS | Performed by: INTERNAL MEDICINE

## 2018-02-08 RX ADMIN — INSULIN LISPRO PRN UNIT: 100 INJECTION, SOLUTION INTRAVENOUS; SUBCUTANEOUS at 18:33

## 2018-02-08 RX ADMIN — ERYTHROPOIETIN SCH UNITS: 20000 INJECTION, SOLUTION INTRAVENOUS; SUBCUTANEOUS at 16:33

## 2018-02-08 NOTE — PRG
DATE OF SERVICE:  02/08/2018

 

Jocelyn Padilla is doing well today.  Wound care will place a wound VAC on her wound.  The patient c
an be discharged from a surgical standpoint at any time.  Probably oral antibiotics for 4-5 days is a
ll that is necessary.  She can follow up in my office in 3-4 weeks.  At this point, I will see her as
 needed.  Please call if necessary.

## 2018-02-08 NOTE — PRG
DATE OF SERVICE:  02/08/2018

 

SUBJECTIVE:  Patient was seen and examined at bedside and overnight events noted.  Patient denies any
 shortness of breath or chest pain or palpitation.  No history of nausea or vomiting or diarrhea or f
ever or chills or cramps.

 

OBJECTIVE:

GENERAL:  This is an obese female in no apparent distress.

VITAL SIGNS:  Temperature 97.7, pulse 87, respiratory rate 16, blood pressure 127/71.

HEENT:  Atraumatic, normocephalic.  Oral mucosa is moist.

NECK:  Supple.

CARDIOVASCULAR:  S1, S2 heard.  Rate and rhythm regular.

RESPIRATORY:  Clear to auscultation.

GASTROINTESTINAL:  Abdomen is soft.

MUSCULOSKELETAL:  No tenderness.  No edema.

DERMATOLOGIC:  No skin rash.

NEUROLOGIC:  Alert and awake and oriented x3.  No focal neurologic deficits. Moving all the extremiti
es.

PSYCHIATRIC:  Mood and affect normal.

 

LABORATORY DATA:  Potassium 3.5, BUN is 23, creatinine 3.03.

 

ASSESSMENT AND PLAN:

1.  End-stage renal disease.  Continue on hemodialysis as tolerated.

2.  Anemia.

3.  Edema, remove fluid.

4.  Hypertension, stable.

 

Plan is to continue on dialysis as tolerated.

## 2018-02-08 NOTE — PDOC.PN
- Subjective


Encounter Start Date: 02/08/18


Encounter Start Time: 09:00


Subjective: No c/o


-: No acute events overnight. 





- Objective


Resuscitation Status: 


 











Resuscitation Status           FULL:Full Resuscitation














MAR Reviewed: Yes


Vital Signs & Weight: 


 Vital Signs (12 hours)











  Temp Pulse Resp BP BP BP Pulse Ox


 


 02/08/18 08:00  98 F  78  16  107/48 L    100


 


 02/08/18 07:32  98 F  78  16    107/48 L  100


 


 02/08/18 04:00  98.1 F  65  16   110/56 L   98








 Weight











Admit Weight                   246 lb 14.4 oz


 


Weight                         224 lb 13.944 oz














I&O: 


 











 02/07/18 02/08/18 02/09/18





 06:59 06:59 06:59


 


Intake Total 705 900 


 


Output Total 0 0 


 


Balance 705 900 











Result Diagrams: 


 02/09/18 04:10





 02/09/18 04:10


Additional Labs: 


 Accuchecks











  02/08/18 02/07/18 02/07/18





  05:50 20:52 18:24


 


POC Glucose  217 H  186 H  164 H














Phys Exam





- Physical Examination


Constitutional: NAD


HEENT: PERRLA, moist MMs, sclera anicteric


Neck: supple, full ROM


Respiratory: no wheezing, no rales, no rhonchi, clear to auscultation bilateral


Cardiovascular: RRR, no significant murmur, no rub


Gastrointestinal: soft, non-tender, no distention, positive bowel sounds


Musculoskeletal: pulses present, edema present (L arm)


Neurological: non-focal, moves all 4 limbs


Psychiatric: normal affect, A&O x 3


Skin: no rash, normal turgor





Dx/Plan


(1) ESRD (end stage renal disease) on dialysis


Code(s): N18.6 - END STAGE RENAL DISEASE; Z99.2 - DEPENDENCE ON RENAL DIALYSIS 

  Status: Chronic   Comment: On TTS schedule. Missed at least 2 sessions before 

this admission.


Will continue TS schedule while on admission


Renal on board. 


   





(2) History of DVT (deep vein thrombosis)


Code(s): Z86.718 - PERSONAL HISTORY OF OTHER VENOUS THROMBOSIS AND EMBOLISM   

Status: Acute   Comment: On AC w Eliquis. 


   





(3) Coronary artery disease


Code(s): I25.10 - ATHSCL HEART DISEASE OF NATIVE CORONARY ARTERY W/O ANG PCTRS 

  Status: Chronic   


Qualifiers: 


   Coronary Disease-Associated Artery/Lesion type: unspecified vessel or lesion 

type   Native vs. transplanted heart: native heart   Associated angina: without 

angina   Qualified Code(s): I25.10 - Atherosclerotic heart disease of native 

coronary artery without angina pectoris   


Comment: Stable, chest pain free. 


Continue home regimen. 


   





(4) Hypertension


Code(s): I10 - ESSENTIAL (PRIMARY) HYPERTENSION   Status: Chronic   


Qualifiers: 


   Hypertension type: essential hypertension   Qualified Code(s): I10 - 

Essential (primary) hypertension   


Comment: At goal. 





   





(5) Diabetes mellitus


Code(s): E11.9 - TYPE 2 DIABETES MELLITUS WITHOUT COMPLICATIONS   Status: 

Chronic   


Qualifiers: 


   Diabetes mellitus complication status: with kidney complications   Diabetes 

mellitus complication detail: with chronic kidney disease   Chronic kidney 

disease stage: on chronic dialysis 


Comment: on SSI


Long acting insulin discontinued 2/2 hypoglycemia. 


   





(6) Hypoglycemia


Code(s): E16.2 - HYPOGLYCEMIA, UNSPECIFIED   Status: Resolved   Comment: 

Improving. 


Long acting insulin being held. 


Continue SSI   





(7) Physical deconditioning


Code(s): R53.81 - OTHER MALAISE   Status: Acute   Comment: Likely 2/2 prolonged 

hospitalization.


Will need NICKY placement.    





(8) Sacral decubitus ulcer


Status: Acute   


Qualifiers: 


   Pressure ulcer stage: unstageable   Qualified Code(s): L89.150 - Pressure 

ulcer of sacral region, unstageable   


Comment: s/p debridement. 


Cx grew E. Coli, barker sensitive.


Started on Omnicef. 





   





- Plan





* .

## 2018-02-08 NOTE — OP
DATE OF PROCEDURE:  02/07/2018

 

PREOPERATIVE DIAGNOSES:  End-stage renal disease, status post radiation and chemotherapy for vaginal 
bleeding for endometrial cancer, status post hysterectomy, foul smelling necrotic sacral decubitus 10
 x 7 cm.

 

POSTOPERATIVE DIAGNOSES:  End-stage renal disease, status post radiation and chemotherapy for vaginal
 bleeding for endometrial cancer, status post hysterectomy, foul smelling necrotic sacral decubitus 1
0 x 7 cm.

 

PROCEDURES:  Debridement of skin and subcutaneous tissue to the deep sacrum, mostly right buttocks.  
Wound left open to heal by secondary intention.  Surgicel and Isiah placed for hemostasis.  Wound VA
C to be applied tomorrow.

 

SURGEON:  Rex Xiao MD

 

ANESTHESIA:  General.

 

DESCRIPTION OF PROCEDURE:  The patient was taken to the operating room, where under general anesthesi
a under right lateral decubitus position, buttocks and presacral area prepared with ChloraPrep, drape
d in routine fashion.  Necrotic skin and subcutaneous tissue debrided sharply, _____ 10-blade scalpel
 down to the fascia and muscle.  Hemostasis gained with the cautery.  Wound irrigated.  Good hemostas
is noted and healthy tissue remained.  Isiah and Surgicel applied.  Sterile dressing applied.  The p
atient tolerated the procedure well.

## 2018-02-09 LAB
ANION GAP SERPL CALC-SCNC: 11 MMOL/L (ref 10–20)
BASOPHILS # BLD AUTO: 0 THOU/UL (ref 0–0.2)
BASOPHILS NFR BLD AUTO: 0 % (ref 0–1)
BUN SERPL-MCNC: 23 MG/DL (ref 9.8–20.1)
CALCIUM SERPL-MCNC: 8.4 MG/DL (ref 7.8–10.44)
CHLORIDE SERPL-SCNC: 96 MMOL/L (ref 98–107)
CO2 SERPL-SCNC: 29 MMOL/L (ref 23–31)
CREAT CL PREDICTED SERPL C-G-VRATE: 31 ML/MIN (ref 70–130)
EOSINOPHIL # BLD AUTO: 0.1 THOU/UL (ref 0–0.7)
EOSINOPHIL NFR BLD AUTO: 2.9 % (ref 0–10)
GLUCOSE SERPL-MCNC: 63 MG/DL (ref 80–115)
HGB BLD-MCNC: 7.3 G/DL (ref 12–16)
INR PPP: 1.6
LYMPHOCYTES # BLD: 0.6 THOU/UL (ref 1.2–3.4)
LYMPHOCYTES NFR BLD AUTO: 15 % (ref 21–51)
MCH RBC QN AUTO: 31.6 PG (ref 27–31)
MCV RBC AUTO: 97.2 FL (ref 81–99)
MONOCYTES # BLD AUTO: 0.4 THOU/UL (ref 0.11–0.59)
MONOCYTES NFR BLD AUTO: 10.2 % (ref 0–10)
NEUTROPHILS # BLD AUTO: 2.8 THOU/UL (ref 1.4–6.5)
NEUTROPHILS NFR BLD AUTO: 71.9 % (ref 42–75)
PLATELET # BLD AUTO: 145 THOU/UL (ref 130–400)
POTASSIUM SERPL-SCNC: 3.8 MMOL/L (ref 3.5–5.1)
PROTHROMBIN TIME: 19.3 SEC (ref 12–14.7)
RBC # BLD AUTO: 2.3 MILL/UL (ref 4.2–5.4)
SODIUM SERPL-SCNC: 132 MMOL/L (ref 136–145)
WBC # BLD AUTO: 3.9 THOU/UL (ref 4.8–10.8)

## 2018-02-09 PROCEDURE — B5181ZA FLUOROSCOPY OF SUPERIOR VENA CAVA USING LOW OSMOLAR CONTRAST, GUIDANCE: ICD-10-PCS | Performed by: RADIOLOGY

## 2018-02-09 PROCEDURE — 05763ZZ DILATION OF LEFT SUBCLAVIAN VEIN, PERCUTANEOUS APPROACH: ICD-10-PCS | Performed by: RADIOLOGY

## 2018-02-09 PROCEDURE — B51W1ZZ FLUOROSCOPY OF DIALYSIS SHUNT/FISTULA USING LOW OSMOLAR CONTRAST: ICD-10-PCS | Performed by: RADIOLOGY

## 2018-02-09 RX ADMIN — Medication SCH ML: at 19:46

## 2018-02-09 RX ADMIN — HYDROCODONE BITARTRATE AND ACETAMINOPHEN PRN TAB: 5; 325 TABLET ORAL at 07:13

## 2018-02-09 RX ADMIN — INSULIN LISPRO PRN UNIT: 100 INJECTION, SOLUTION INTRAVENOUS; SUBCUTANEOUS at 19:44

## 2018-02-09 NOTE — PDOC.PN
- Subjective


Encounter Start Date: 02/09/18


Encounter Start Time: 12:48


Subjective: c/o sacral decub pain


-: No acute overnight events. 





- Objective


Resuscitation Status: 


 











Resuscitation Status           FULL:Full Resuscitation














MAR Reviewed: Yes


Vital Signs & Weight: 


 Vital Signs (12 hours)











  Temp Pulse Resp BP BP Pulse Ox


 


 02/09/18 07:57  98.4 F  89  18   131/60  92 L


 


 02/09/18 07:23  98.6 F  85  18   


 


 02/09/18 07:16   85   145/73 H  


 


 02/09/18 07:03  98.6 F  85  20   150/71 H  98


 


 02/09/18 04:00  98.5 F  85  18   145/73 H  98








 Weight











Admit Weight                   246 lb 14.4 oz


 


Weight                         223 lb 1.725 oz














I&O: 


 











 02/08/18 02/09/18 02/10/18





 06:59 06:59 06:59


 


Intake Total 900 892 


 


Output Total 0  


 


Balance 900 892 











Result Diagrams: 


 02/09/18 04:10





 02/09/18 04:10


Additional Labs: 


 Accuchecks











  02/09/18 02/09/18 02/09/18





  11:08 08:45 05:07


 


POC Glucose  104  94  75














  02/08/18 02/08/18 02/08/18





  20:11 18:27 13:11


 


POC Glucose  154 H  224 H  179 H














Phys Exam





- Physical Examination


Constitutional: NAD


HEENT: PERRLA, moist MMs, sclera anicteric


Neck: supple, full ROM


Respiratory: no wheezing, no rales, no rhonchi, clear to auscultation bilateral


Cardiovascular: RRR, no significant murmur, no rub


Gastrointestinal: soft, non-tender, no distention, positive bowel sounds


Musculoskeletal: pulses present, edema present (worse, left ectremities)


Neurological: non-focal, moves all 4 limbs


Psychiatric: normal affect, A&O x 3


Skin: no rash, normal turgor





Dx/Plan


(1) Anemia due to end stage renal disease


Code(s): N18.6 - END STAGE RENAL DISEASE; D63.1 - ANEMIA IN CHRONIC KIDNEY 

DISEASE   Status: Chronic   Comment: Hb steadily trending down


Will get anemia w/u including occult blood to r/o bleeding


Type and screen.


Likely transfuse during HD tomorrow   





(2) ESRD (end stage renal disease) on dialysis


Code(s): N18.6 - END STAGE RENAL DISEASE; Z99.2 - DEPENDENCE ON RENAL DIALYSIS 

  Status: Chronic   Comment: On TTS schedule. Missed at least 2 sessions before 

this admission.


Will continue TS schedule while on admission


Renal on board. 


   





(3) History of DVT (deep vein thrombosis)


Code(s): Z86.718 - PERSONAL HISTORY OF OTHER VENOUS THROMBOSIS AND EMBOLISM   

Status: Acute   Comment: On AC w Eliquis. 


   





(4) Coronary artery disease


Code(s): I25.10 - ATHSCL HEART DISEASE OF NATIVE CORONARY ARTERY W/O ANG PCTRS 

  Status: Chronic   


Qualifiers: 


   Coronary Disease-Associated Artery/Lesion type: unspecified vessel or lesion 

type   Native vs. transplanted heart: native heart   Associated angina: without 

angina   Qualified Code(s): I25.10 - Atherosclerotic heart disease of native 

coronary artery without angina pectoris   


Comment: Stable, chest pain free. 


Continue home regimen. 


   





(5) Hypertension


Code(s): I10 - ESSENTIAL (PRIMARY) HYPERTENSION   Status: Chronic   


Qualifiers: 


   Hypertension type: essential hypertension   Qualified Code(s): I10 - 

Essential (primary) hypertension   


Comment: At goal. 





   





(6) Diabetes mellitus


Code(s): E11.9 - TYPE 2 DIABETES MELLITUS WITHOUT COMPLICATIONS   Status: 

Chronic   


Qualifiers: 


   Diabetes mellitus complication status: with kidney complications   Diabetes 

mellitus complication detail: with chronic kidney disease   Chronic kidney 

disease stage: on chronic dialysis 


Plan: 


Continue current management.





Comment: on SSI


Long acting insulin discontinued 2/2 hypoglycemia. 


   





(7) Physical deconditioning


Code(s): R53.81 - OTHER MALAISE   Status: Acute   Comment: Likely 2/2 prolonged 

hospitalization.


Will need NICKY placement.    





(8) Sacral decubitus ulcer


Status: Acute   


Qualifiers: 


   Pressure ulcer stage: unstageable   Qualified Code(s): L89.150 - Pressure 

ulcer of sacral region, unstageable   


Comment: s/p debridement. 


Cx grew E. Coli, barker sensitive.


Started on Omnicef. 





   





- Plan


cont current plan of care, continue antibiotics, PT/OT, 





* .

## 2018-02-09 NOTE — SPC
PROCEDURES:

1.      Left upper extremity arteriovenous dialysis fistulogram with venogram to the SVC.

2.      PTA focal severe instant stenosis left subclavian vein. 

 

2/9/18

 

HISTORY: 

Patient with left upper extremity arm and forearm swelling which started this morning. Patient has en
d stage renal disease and left upper extremity arteriovenous dialysis fistula. Fistulogram and venogr
am were requested. 

 

FLUOROSCOPY:

Total fluoroscopy time is 3.7 minutes with total dose of 37,778 mGy*cm2. 

 

TECHNIQUE:  

After informed consent was obtained, patient was placed on the angiography table in the supine positi
on. The left upper extremity was meticulously prepped and draped in the usual sterile fashion. Skin a
nd subcutaneous tissues were infiltrated with buffered 1% lidocaine for local anesthesia at the inten
ded puncture site. The left upper extremity arteriovenous dialysis fistula was accessed, directed in 
the venous direction and a 5 Citizen of the [place] introducer sheath was placed. A fistulogram and venogram of the S
VC were performed. Manual compression was applied to the venous outflow, and contrast was faintly ref
luxed through the arteriovenous anastomosis. 

 

The introducer sheath was exchanged over a 0.035 inch Amplatz guidewire for a 6 Citizen of the [place] vascular sheat
h. An 8 mm x 4 cm angioplasty balloon was placed over the guide wire and positioned across the focal 
area of severe narrowing in the left subclavian stent. Angioplasty was performed. Although the lumina
l narrowing was improved, persistent moderate area of narrowing persisted. As the result, the 8 mm di
ameter Angioplasty balloon was exchanged over the guidewire for a 10 mm x 4 cm angioplasty balloon. P
TA was performed in this region. Followup venogram demonstrates improved luminal diameter and brisk f
low throughout the fistula. As the result, the procedure was terminated. The guide wire and 6 Citizen of the [place] 
vascular sheath were removed. Hemostasis was achieved with direct pressure. A dry sterile dressing wa
s placed. The patient tolerated the procedure well without immediate complication. 

 

FINDINGS:  

Left upper extremity arteriovenous dialysis fistula with two separate stents within the venous outflo
w, one of which is located within the left subclavian vein and demonstrates severe in-stent stenosis.
 PTA of this focal area of narrowing was obtained up to 10 mm in diameter with improvement in luminal
 diameter and brisk flow postprocedure. Patient also noted decrease in pressure sensation and relief 
from left upper extremity discomfort after angioplasty. Remainder of the venous outflow is patent to 
the SVC. Arteriovenous anastomosis is also patent. 

 

IMPRESSION:  

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 PTA was performed up to 10 mm in diameter with i
mprovement in luminal diameter and brisk flow throughout the fistula postprocedure. 

 

POS: DANUTA

## 2018-02-09 NOTE — PRG
DATE OF SERVICE:  02/09/2018

 

SUBJECTIVE:  Patient was seen and examined at bedside and overnight events noted.  Patient denies any
 shortness of breath or chest pain or palpitation.  No history of nausea or vomiting or diarrhea or f
ever or chills or cramps.

 

OBJECTIVE:

GENERAL:  This is an obese female, in no apparent distress.

VITAL SIGNS:  Temperature 98.4, pulse 89, respiratory rate 18, blood pressure 131/60.

HEENT:  Atraumatic, normocephalic.  Oral mucosa is moist.

NECK:  Supple.

CARDIOVASCULAR:  S1, S2 heard, rate and rhythm regular

RESPIRATORY:  Clear to auscultation.

GASTROINTESTINAL:  Abdomen is soft.

MUSCULOSKELETAL:  3+ edema in the left upper and lower extremity, 1+ edema in the right side.

DERMATOLOGIC:  No skin rash.

NEUROLOGIC:  Alert and awake and oriented x3, no focal neurologic deficits.  Moving all the extremiti
es.

PSYCHIATRIC:  Mood and affect normal

 

LABORATORY DATA:  Hemoglobin is 7.3, potassium is 3.8, BUN 23, creatinine 3.02.

 

ASSESSMENT AND PLAN:

1.  End-stage renal disease.  We will continue on dialysis _____.

2.  Edema and dialysis access.  Consult Dr. Xiao and plan is to have fistulogram.

3.  Edema.  Remove fluid with dialysis.

4.  Hypertension, stable.

5.  Overall, prognosis is poor.  We will _____ evaluation.  Case discussed with Dr. Xiao.

## 2018-02-09 NOTE — PRG
DATE OF SERVICE:  02/09/2018

 

Jocelyn Padilla is seen today at the request of Dr. Holland.  The patient has a left upper arm basil
ic vein transposition fistula that I placed in 2014.  It is reported that her left arm is edematous. 
 As I questioned the patient, she has been sent from [facility] to [facility] for surveillance after stent was placed in some location.  The patient has an appointment 
to be seen today at [facility] for surveillance.  This is most likely the origin of he
r problem.  The patient has a stent.  On looking at her chest x-ray the stent is present  at these ce
phalic vein, subclavian vein junction, that this will be a chronic problem.  On the peripheral edge o
f the chest x-ray I see that she has a stent in her mid basilic vein transposition fistula towards th
e proximal humerus.  

 

On exam she has collateral veins over her left chest, not present on her right.

 

ASSESSMENT AND PLAN:  Basilic vein transposition fistula left upper arm in a morbidly obese patient w
ith a stent at the subclavian vein left, and another stent at the mid basilic vein transposition fist
tara, proximal mid humerus area.  In this transposition fistula unfortunately this revision probably c
annot be done and surgical intervention is not an option and interventional followup will be necessar
y.  Would recommend preserving her right arm for future dialysis access as her left arm fistula will 
eventually fail.  

 

As I review her imaging from the past in 01/2016 she had a fistulogram at this facility and it was no
clary at that time that she has a stent in her basilic vein transposition fistula in her proximal humer
us that probably had been placed at an outside facility.  She also had a stenosis in her left subclav
rosemary vein at that time and a PTA was performed.  She had numerous collaterals probably after that inte
rvention at this facility she probably had another intervention at [facility] and placed a stent in her 
left subclavian.  Overall, she has a failing left arm fistula and will need a surveillance interventi
ons to keep it open.  We will obtain a repeat ultrasound of her right arm veins for consideration of 
a right arm fistula in the future.  It  was noted that in 2014 ultrasound of her right arm revealed t
he cephalic vein to be 2.1, 2.3, 1.7 mm and 2.0 mm antecubital fossa basilic vein, 4 mm, 3 mm, 3.7 mm
, 4 mm antecubital fossa.  We will recheck this right arm this hospitalization.  She will need a new 
fistula in the right arm in the future.

## 2018-02-10 LAB
ANION GAP SERPL CALC-SCNC: 11 MMOL/L (ref 10–20)
BASOPHILS # BLD AUTO: 0 THOU/UL (ref 0–0.2)
BASOPHILS NFR BLD AUTO: 0.6 % (ref 0–1)
BUN SERPL-MCNC: 30 MG/DL (ref 9.8–20.1)
CALCIUM SERPL-MCNC: 8.1 MG/DL (ref 7.8–10.44)
CHLORIDE SERPL-SCNC: 94 MMOL/L (ref 98–107)
CO2 SERPL-SCNC: 30 MMOL/L (ref 23–31)
CREAT CL PREDICTED SERPL C-G-VRATE: 24 ML/MIN (ref 70–130)
EOSINOPHIL # BLD AUTO: 0.1 THOU/UL (ref 0–0.7)
EOSINOPHIL NFR BLD AUTO: 2.3 % (ref 0–10)
GLUCOSE SERPL-MCNC: 103 MG/DL (ref 80–115)
HGB BLD-MCNC: 7.5 G/DL (ref 12–16)
INR PPP: 1.4
IRON SERPL-MCNC: 41 UG/DL (ref 50–170)
LYMPHOCYTES # BLD: 0.8 THOU/UL (ref 1.2–3.4)
LYMPHOCYTES NFR BLD AUTO: 15.8 % (ref 21–51)
MCH RBC QN AUTO: 31.7 PG (ref 27–31)
MCV RBC AUTO: 96.7 FL (ref 81–99)
MONOCYTES # BLD AUTO: 0.4 THOU/UL (ref 0.11–0.59)
MONOCYTES NFR BLD AUTO: 8.3 % (ref 0–10)
NEUTROPHILS # BLD AUTO: 3.5 THOU/UL (ref 1.4–6.5)
NEUTROPHILS NFR BLD AUTO: 73 % (ref 42–75)
PLATELET # BLD AUTO: 169 THOU/UL (ref 130–400)
POTASSIUM SERPL-SCNC: 4.1 MMOL/L (ref 3.5–5.1)
PROTHROMBIN TIME: 17.2 SEC (ref 12–14.7)
RBC # BLD AUTO: 2.36 MILL/UL (ref 4.2–5.4)
SODIUM SERPL-SCNC: 131 MMOL/L (ref 136–145)
UIBC SERPL-MCNC: 61 MCG/DL (ref 265–497)
VIT B12 SERPL-MCNC: 1055 PG/ML (ref 211–911)
WBC # BLD AUTO: 4.8 THOU/UL (ref 4.8–10.8)

## 2018-02-10 PROCEDURE — 5A1D70Z PERFORMANCE OF URINARY FILTRATION, INTERMITTENT, LESS THAN 6 HOURS PER DAY: ICD-10-PCS | Performed by: INTERNAL MEDICINE

## 2018-02-10 RX ADMIN — HYDROCODONE BITARTRATE AND ACETAMINOPHEN PRN TAB: 5; 325 TABLET ORAL at 20:34

## 2018-02-10 RX ADMIN — Medication SCH ML: at 20:37

## 2018-02-10 RX ADMIN — ERYTHROPOIETIN SCH UNITS: 20000 INJECTION, SOLUTION INTRAVENOUS; SUBCUTANEOUS at 16:47

## 2018-02-10 RX ADMIN — ALBUMIN HUMAN SCH GM: 250 SOLUTION INTRAVENOUS at 09:50

## 2018-02-10 RX ADMIN — ALBUMIN HUMAN SCH GM: 250 SOLUTION INTRAVENOUS at 10:00

## 2018-02-10 RX ADMIN — Medication SCH: at 08:21

## 2018-02-10 RX ADMIN — HYDROCODONE BITARTRATE AND ACETAMINOPHEN PRN TAB: 5; 325 TABLET ORAL at 06:09

## 2018-02-10 NOTE — PRG
DATE OF SERVICE:  02/10/2018

 

SUBJECTIVE:

Patient was seen and examined at bedside and overnight events noted.  Patient denies any shortness of
 breath or chest pain or palpitation.  No history of nausea or vomiting or diarrhea or fever or chill
s or cramps.

 

OBJECTIVE:

GENERAL:  This is an obese female in no apparent distress.

VITAL SIGNS:  Temperature 96, pulse 85, respiratory rate 18, blood pressure 124/74.

HEENT:  Atraumatic, normocephalic.  Oral mucosa is moist.

Neck:  Supple.

Cardiovascular:  S1 and S2 heard.  Rate and rhythm regular.

Respiratory:  Clear to auscultation.

Gastrointestinal:  Abdomen is soft. 

Musculoskeletal:  No tenderness.  No edema.

Dermatologic:  No skin rash.

Neurologic:  Alert and awake and oriented x3.  No focal neurologic deficits.  Moving all the extremit
ies.

Psychiatric:  Mood and affect normal.

LABORATORY DATA:  Potassium is 4.1, BUN is 30, creatinine 3.9, hemoglobin is 7.5.

 

ASSESSMENT AND PLAN:

1.  End-stage renal disease, currently on hemodialysis, tolerated.  We will continue on dialysis as t
olerated.

2.  Anemia, mild.  We will transfuse if less than 7.

3.  Edema.

4.  Dialysis access.  I appreciate help from Surgery.

5.  Hypertension.  Blood pressure is stable.

6.  Overall, renal function seems to be stable.  We will follow.

## 2018-02-10 NOTE — PDOC.PN
- Subjective


Encounter Start Date: 02/10/18


Encounter Start Time: 12:54


Subjective: No new complaints. 


-: No acute events overnight. 





- Objective


Resuscitation Status: 


 











Resuscitation Status           FULL:Full Resuscitation














MAR Reviewed: Yes


Vital Signs & Weight: 


 Vital Signs (12 hours)











  Temp Pulse Resp BP BP Pulse Ox


 


 02/10/18 12:19  98.6 F  85  18   


 


 02/10/18 12:12   85   166/81 H  


 


 02/10/18 12:00  96.5 F L  79  18   124/74  100


 


 02/10/18 05:19  98.3 F  85  16   137/77  98








 Weight











Admit Weight                   246 lb 14.4 oz


 


Weight                         223 lb 1.725 oz














I&O: 


 











 02/09/18 02/10/18 02/11/18





 06:59 06:59 06:59


 


Intake Total 892 240 


 


Output Total  0 


 


Balance 892 240 











Result Diagrams: 


 02/11/18 06:14





 02/11/18 06:14


Additional Labs: 


 Accuchecks











  02/10/18 02/10/18 02/09/18





  12:01 05:53 19:36


 


POC Glucose  104  98  261 H














  02/09/18





  16:40


 


POC Glucose  105














Phys Exam





- Physical Examination


Constitutional: NAD


HEENT: moist MMs


Neck: supple, full ROM


Respiratory: no wheezing, no rales, no rhonchi


Cardiovascular: RRR, no significant murmur, no rub


Gastrointestinal: soft, non-tender, no distention, positive bowel sounds


Musculoskeletal: pulses present, edema present (L upper extremity)


Neurological: non-focal, moves all 4 limbs


Psychiatric: normal affect, A&O x 3


Skin: no rash, normal turgor





Dx/Plan


(1) ESRD (end stage renal disease) on dialysis


Code(s): N18.6 - END STAGE RENAL DISEASE; Z99.2 - DEPENDENCE ON RENAL DIALYSIS 

  Status: Chronic   Comment: On TTS schedule. Missed at least 2 sessions before 

this admission.


Will continue TS schedule while on admission


Renal on board. 


   





(2) Stenosis of left subclavian vein


Code(s): I87.1 - COMPRESSION OF VEIN   Status: Acute   Comment: Seen on AV 

shunt angiogram- pt had L arm edema.


Will need new access in the future.    





(3) History of DVT (deep vein thrombosis)


Code(s): Z86.718 - PERSONAL HISTORY OF OTHER VENOUS THROMBOSIS AND EMBOLISM   

Status: Acute   Comment: On AC w Eliquis. 


   





(4) Coronary artery disease


Code(s): I25.10 - ATHSCL HEART DISEASE OF NATIVE CORONARY ARTERY W/O ANG PCTRS 

  Status: Chronic   


Qualifiers: 


   Coronary Disease-Associated Artery/Lesion type: unspecified vessel or lesion 

type   Native vs. transplanted heart: native heart   Associated angina: without 

angina   Qualified Code(s): I25.10 - Atherosclerotic heart disease of native 

coronary artery without angina pectoris   


Comment: Stable, chest pain free. 


Continue home regimen. 


   





(5) Hypertension


Code(s): I10 - ESSENTIAL (PRIMARY) HYPERTENSION   Status: Chronic   


Qualifiers: 


   Hypertension type: essential hypertension   Qualified Code(s): I10 - 

Essential (primary) hypertension   


Comment: At goal. 





   





(6) Diabetes mellitus


Code(s): E11.9 - TYPE 2 DIABETES MELLITUS WITHOUT COMPLICATIONS   Status: 

Chronic   


Qualifiers: 


   Diabetes mellitus complication status: with kidney complications   Diabetes 

mellitus complication detail: with chronic kidney disease   Chronic kidney 

disease stage: on chronic dialysis 


Comment: on SSI


Long acting insulin discontinued 2/2 hypoglycemia. 


   





(7) Hypoglycemia


Code(s): E16.2 - HYPOGLYCEMIA, UNSPECIFIED   Status: Resolved   Comment: 

Improving. 


Long acting insulin being held. 


Continue SSI   





(8) Physical deconditioning


Code(s): R53.81 - OTHER MALAISE   Status: Acute   Comment: Likely 2/2 prolonged 

hospitalization.


Will need NICKY placement.    





(9) Sacral decubitus ulcer


Status: Acute   


Qualifiers: 


   Pressure ulcer stage: unstageable   Qualified Code(s): L89.150 - Pressure 

ulcer of sacral region, unstageable   


Plan: 


Continue Omnicef





Comment: s/p debridement. 


Cx grew E. Coli, barker sensitive.


Started on Omnicef. 





   





- Plan


cont current plan of care, continue antibiotics, PT/OT, 





* .

## 2018-02-11 LAB
ANION GAP SERPL CALC-SCNC: 16 MMOL/L (ref 10–20)
BASOPHILS # BLD AUTO: 0 THOU/UL (ref 0–0.2)
BASOPHILS NFR BLD AUTO: 0.2 % (ref 0–1)
BUN SERPL-MCNC: 20 MG/DL (ref 9.8–20.1)
CALCIUM SERPL-MCNC: 8.7 MG/DL (ref 7.8–10.44)
CHLORIDE SERPL-SCNC: 98 MMOL/L (ref 98–107)
CO2 SERPL-SCNC: 26 MMOL/L (ref 23–31)
CREAT CL PREDICTED SERPL C-G-VRATE: 33 ML/MIN (ref 70–130)
EOSINOPHIL # BLD AUTO: 0 THOU/UL (ref 0–0.7)
EOSINOPHIL NFR BLD AUTO: 0.5 % (ref 0–10)
GLUCOSE SERPL-MCNC: 215 MG/DL (ref 80–115)
HGB BLD-MCNC: 7.9 G/DL (ref 12–16)
INR PPP: 1.3
LYMPHOCYTES # BLD: 0.9 THOU/UL (ref 1.2–3.4)
LYMPHOCYTES NFR BLD AUTO: 15.6 % (ref 21–51)
MCH RBC QN AUTO: 32.3 PG (ref 27–31)
MCV RBC AUTO: 97.9 FL (ref 81–99)
MONOCYTES # BLD AUTO: 0.3 THOU/UL (ref 0.11–0.59)
MONOCYTES NFR BLD AUTO: 5.5 % (ref 0–10)
NEUTROPHILS # BLD AUTO: 4.5 THOU/UL (ref 1.4–6.5)
NEUTROPHILS NFR BLD AUTO: 78.1 % (ref 42–75)
PLATELET # BLD AUTO: 188 THOU/UL (ref 130–400)
POTASSIUM SERPL-SCNC: 4.8 MMOL/L (ref 3.5–5.1)
PROTHROMBIN TIME: 16.4 SEC (ref 12–14.7)
RBC # BLD AUTO: 2.45 MILL/UL (ref 4.2–5.4)
SODIUM SERPL-SCNC: 135 MMOL/L (ref 136–145)
WBC # BLD AUTO: 5.7 THOU/UL (ref 4.8–10.8)

## 2018-02-11 RX ADMIN — HYDROCODONE BITARTRATE AND ACETAMINOPHEN PRN TAB: 5; 325 TABLET ORAL at 20:24

## 2018-02-11 RX ADMIN — INSULIN LISPRO PRN UNIT: 100 INJECTION, SOLUTION INTRAVENOUS; SUBCUTANEOUS at 23:06

## 2018-02-11 RX ADMIN — INSULIN LISPRO PRN UNIT: 100 INJECTION, SOLUTION INTRAVENOUS; SUBCUTANEOUS at 17:06

## 2018-02-11 RX ADMIN — Medication SCH ML: at 20:27

## 2018-02-11 RX ADMIN — INSULIN LISPRO PRN UNIT: 100 INJECTION, SOLUTION INTRAVENOUS; SUBCUTANEOUS at 14:24

## 2018-02-11 RX ADMIN — HYDROCODONE BITARTRATE AND ACETAMINOPHEN PRN TAB: 5; 325 TABLET ORAL at 08:16

## 2018-02-11 RX ADMIN — Medication SCH ML: at 08:19

## 2018-02-11 NOTE — PRG
DATE OF SERVICE:  02/11/2018

 

SUBJECTIVE:

Patient was seen and examined at bedside and overnight events noted.  Patient denies any shortness of
 breath or chest pain or palpitation.  No history of nausea or vomiting or diarrhea or fever or chill
s or cramps. 

 

OBJECTIVE:

GENERAL:  This is an obese female in no acute distress

VITAL SIGNS:  Temperature 97.8, pulse rate 80, respiratory rate 18, blood pressure 154/87.

HEENT:  Atraumatic, normocephalic.  Oral mucosa is moist

NECK:  Supple 

CARDIOVASCULAR:  S1, S2 heard.  Rate and rhythm regular

RESPIRATORY:  Clear to auscultation.

GASTROINTESTINAL:  Abdomen is soft.

MUSCULOSKELETAL:  No tenderness.  No edema.

DERMATOLOGIC:  No skin rash.

NEUROLOGIC:  Alert and awake and oriented X3.  No focal neurologic deficits.  Moving all the extremit
ies.

PSYCHIATRIC:  Mood and affect normal

 

LABORATORY DATA:

Potassium is 4.8, BUN is 20, creatinine is 2.7.

 

ASSESSMENT AND PLAN:

1.  End-stage renal disease.  We will continue on dialysis.

2.  Hypertension, stable.

3.  Edema, controlled.

4.  _____ much better.

5.  Obesity.

6.  Anemia.  Continue OPAL with dialysis.

7.  Overall, edema seems to be better.  We will continue on dialysis as tolerated.

## 2018-02-11 NOTE — PDOC.PN
- Subjective


Encounter Start Date: 02/11/18


Encounter Start Time: 10:35


-: No acute events overnight


-: No new complaints





- Objective


Resuscitation Status: 


 











Resuscitation Status           FULL:Full Resuscitation














MAR Reviewed: Yes


Vital Signs & Weight: 


 Vital Signs (12 hours)











  Temp Pulse Resp BP BP BP Pulse Ox


 


 02/11/18 08:34  98.4 F  97  18    153/86 H  100


 


 02/11/18 08:15   92   153/86 H   


 


 02/11/18 08:00  98.4 F  97  18     100


 


 02/11/18 04:00  98.3 F  92  20   115/66   95


 


 02/11/18 00:00  98.6 F  88  20   146/86 H   99








 Weight











Admit Weight                   246 lb 14.4 oz


 


Weight                         223 lb 1.725 oz














I&O: 


 











 02/10/18 02/11/18 02/12/18





 06:59 06:59 06:59


 


Intake Total 240 850 


 


Output Total 0 2700 


 


Balance 240 -1850 











Result Diagrams: 


 02/11/18 06:14





 02/11/18 06:14


Additional Labs: 


 Accuchecks











  02/11/18 02/10/18 02/10/18





  05:50 21:47 16:37


 


POC Glucose  272 H  177 H  154 H














  02/10/18





  12:01


 


POC Glucose  104














Phys Exam





- Physical Examination


Constitutional: NAD


HEENT: PERRLA, moist MMs, sclera anicteric


Neck: supple, full ROM


Respiratory: no wheezing, no rales, no rhonchi, clear to auscultation bilateral


Cardiovascular: RRR, no significant murmur, no rub


Gastrointestinal: soft, non-tender, no distention, positive bowel sounds


Musculoskeletal: edema present (L arm and leg)


Neurological: non-focal, moves all 4 limbs


Psychiatric: normal affect, A&O x 3


Skin: no rash, normal turgor





Dx/Plan


(1) ESRD (end stage renal disease) on dialysis


Code(s): N18.6 - END STAGE RENAL DISEASE; Z99.2 - DEPENDENCE ON RENAL DIALYSIS 

  Status: Chronic   Comment: On TTS schedule. Missed at least 2 sessions before 

this admission.


Will continue TS schedule while on admission


Renal on board. 


   





(2) Stenosis of left subclavian vein


Code(s): I87.1 - COMPRESSION OF VEIN   Status: Acute   Comment: Seen on AV 

shunt angiogram- pt had L arm edema.


Will need new access in the future.    





(3) History of DVT (deep vein thrombosis)


Code(s): Z86.718 - PERSONAL HISTORY OF OTHER VENOUS THROMBOSIS AND EMBOLISM   

Status: Acute   Comment: On AC w Eliquis. 


   





(4) Coronary artery disease


Code(s): I25.10 - ATHSCL HEART DISEASE OF NATIVE CORONARY ARTERY W/O ANG PCTRS 

  Status: Chronic   


Qualifiers: 


   Coronary Disease-Associated Artery/Lesion type: unspecified vessel or lesion 

type   Native vs. transplanted heart: native heart   Associated angina: without 

angina   Qualified Code(s): I25.10 - Atherosclerotic heart disease of native 

coronary artery without angina pectoris   


Comment: Stable, chest pain free. 


Continue home regimen. 


   





(5) Hypertension


Code(s): I10 - ESSENTIAL (PRIMARY) HYPERTENSION   Status: Chronic   


Qualifiers: 


   Hypertension type: essential hypertension   Qualified Code(s): I10 - 

Essential (primary) hypertension   


Comment: At goal. 





   





(6) Diabetes mellitus


Code(s): E11.9 - TYPE 2 DIABETES MELLITUS WITHOUT COMPLICATIONS   Status: 

Chronic   


Qualifiers: 


   Diabetes mellitus complication status: with kidney complications   Diabetes 

mellitus complication detail: with chronic kidney disease   Chronic kidney 

disease stage: on chronic dialysis 


Comment: on SSI


Long acting insulin discontinued 2/2 hypoglycemia. 


   





(7) Hypoglycemia


Code(s): E16.2 - HYPOGLYCEMIA, UNSPECIFIED   Status: Resolved   Comment: 

Improving. 


Long acting insulin being held. 


Continue SSI   





(8) Physical deconditioning


Code(s): R53.81 - OTHER MALAISE   Status: Acute   Comment: Likely 2/2 prolonged 

hospitalization.


Will need NICKY placement.    





(9) Sacral decubitus ulcer


Status: Acute   Comment: s/p debridement. 


Cx grew E. Coli, barker sensitive.


Started on Omnicef. 





   





- Plan


cont current plan of care, continue antibiotics, DVT proph w/heparin


Discontinue Oxygen supplementation


-: Awaiting placement. 





* .

## 2018-02-12 VITALS — TEMPERATURE: 99.1 F

## 2018-02-12 VITALS — SYSTOLIC BLOOD PRESSURE: 112 MMHG | DIASTOLIC BLOOD PRESSURE: 68 MMHG

## 2018-02-12 LAB
ANION GAP SERPL CALC-SCNC: 11 MMOL/L (ref 10–20)
BASOPHILS # BLD AUTO: 0 THOU/UL (ref 0–0.2)
BASOPHILS NFR BLD AUTO: 0.2 % (ref 0–1)
BUN SERPL-MCNC: 26 MG/DL (ref 9.8–20.1)
CALCIUM SERPL-MCNC: 8.5 MG/DL (ref 7.8–10.44)
CHLORIDE SERPL-SCNC: 98 MMOL/L (ref 98–107)
CO2 SERPL-SCNC: 29 MMOL/L (ref 23–31)
CREAT CL PREDICTED SERPL C-G-VRATE: 27 ML/MIN (ref 70–130)
EOSINOPHIL # BLD AUTO: 0.1 THOU/UL (ref 0–0.7)
EOSINOPHIL NFR BLD AUTO: 1.3 % (ref 0–10)
GLUCOSE SERPL-MCNC: 68 MG/DL (ref 80–115)
HGB BLD-MCNC: 7.2 G/DL (ref 12–16)
INR PPP: 1.3
LYMPHOCYTES # BLD: 0.9 THOU/UL (ref 1.2–3.4)
LYMPHOCYTES NFR BLD AUTO: 18.7 % (ref 21–51)
MCH RBC QN AUTO: 31.3 PG (ref 27–31)
MCV RBC AUTO: 97.2 FL (ref 81–99)
MONOCYTES # BLD AUTO: 0.3 THOU/UL (ref 0.11–0.59)
MONOCYTES NFR BLD AUTO: 7 % (ref 0–10)
NEUTROPHILS # BLD AUTO: 3.5 THOU/UL (ref 1.4–6.5)
NEUTROPHILS NFR BLD AUTO: 72.8 % (ref 42–75)
PLATELET # BLD AUTO: 176 THOU/UL (ref 130–400)
POTASSIUM SERPL-SCNC: 4.3 MMOL/L (ref 3.5–5.1)
PROTHROMBIN TIME: 16.5 SEC (ref 12–14.7)
RBC # BLD AUTO: 2.3 MILL/UL (ref 4.2–5.4)
SODIUM SERPL-SCNC: 134 MMOL/L (ref 136–145)
WBC # BLD AUTO: 4.8 THOU/UL (ref 4.8–10.8)

## 2018-02-12 RX ADMIN — Medication SCH ML: at 08:28

## 2018-02-12 RX ADMIN — HYDROCODONE BITARTRATE AND ACETAMINOPHEN PRN TAB: 5; 325 TABLET ORAL at 08:24

## 2018-02-12 NOTE — PRG
DATE OF SERVICE:  02/12/2018

 

Jocelyn Padilla is being transferred to the [facility] today.  She dialyzes Tuesday, Thurs
day, and Saturday using left arm fistula that I placed in 2014, basilic vein transposition type.  She
 has been to the [place] [facility], and a stent has been placed in the mid fistula and al
so in the left subclavian vein.  She has required frequent interventions to maintain patency.  Friday
, 3 days ago, intervention was performed here.  It was scheduled for [place], but she was in the hosp
ital, thus was performed here.  PTA of the left subclavian vein stent was performed relieving the hig
h-grade obstruction.  This is a recurrent problem that will eventually result in occlusion of left ar
m graft.  She has development of collaterals.  Patient has a large decubitus, for which wound VAC is 
being treated.  She completed radiation and chemotherapy for endometrial cancer at [facility] result
ing in the decubitus and then transferred here for care of her decubitus.  The patient had ultrasound
 vein mapping 03/2014, noting the right upper arm cephalic vein to be 2.1, 2.3, 1.7 mm in the distal 
arm and 2.1 mm in the antecubital fossa.  Her basilic vein was of good caliber, 4 mm, 3 mm, 3.7 mm, a
nd 4 mm in 2014.  Ultrasound vein mapping of the right arm has been ordered today to reassess this.  
As I walked into the room, she has a bandage over her right antecubital fossa and IV in her right mid
 forearm.  This was immediately removed.  Patient has a MediPort, right internal jugular vein, which 
has not been accessed.  Orders have been written today to avoid IV access blood draws from her right 
arm and to save her right arm for dialysis access.  She will need a right arm fistula in the future f
or this failing left arm fistula.  I would probably avoid placing a prosthetic graft in the right arm
 anticipating that this may not be used for an unknown period of time and would reserve a prosthetic 
for the future.  Patient can be discharged to the nursing home after ultrasound vein mapping of right
 arm.  I will set up Monday and Wednesday or Friday surgery date in the next 2 weeks for right arm fi
stula pending vein mapping results.

## 2018-02-12 NOTE — PDOC.PN
- Subjective


Encounter Start Date: 02/12/18


Encounter Start Time: 07:00


-: old records requested/rev





Patient seen and examined. No new complaints. No overnight events


she is hurting but that is not abnormal for her, 





- Objective


Resuscitation Status: 


 











Resuscitation Status           FULL:Full Resuscitation














MAR Reviewed: Yes


Vital Signs & Weight: 


 Vital Signs (12 hours)











  Temp Pulse Resp BP Pulse Ox


 


 02/12/18 08:00  99.1 F  88  16  109/67  91 L








 Weight











Admit Weight                   246 lb 14.4 oz


 


Weight                         223 lb 1.725 oz














I&O: 


 











 02/11/18 02/12/18 02/13/18





 06:59 06:59 06:59


 


Intake Total 850 800 


 


Output Total 2700  


 


Balance -1850 800 











Result Diagrams: 


 02/12/18 04:32





 02/12/18 04:32


Additional Labs: 


 Accuchecks











  02/12/18 02/11/18 02/11/18





  05:53 19:40 16:32


 


POC Glucose  78  332 H  224 H














  02/11/18





  11:05


 


POC Glucose  267 H














Phys Exam





- Physical Examination


Constitutional: NAD


HEENT: PERRLA, moist MMs, sclera anicteric, TM's clear


Neck: no JVD, supple, full ROM


Respiratory: no wheezing, no rales, no rhonchi


Cardiovascular: RRR, no significant murmur, no rub


Gastrointestinal: soft, non-tender, no distention, positive bowel sounds


Musculoskeletal: no edema, pulses present


Neurological: non-focal


Psychiatric: normal affect, A&O x 3


Skin: normal turgor


Deviation from normal: wound vac in place at decubitus ulcer





Dx/Plan


(1) Demand ischemia of myocardium


Code(s): I24.8 - OTHER FORMS OF ACUTE ISCHEMIC HEART DISEASE   Status: Acute   





(2) Physical deconditioning


Code(s): R53.81 - OTHER MALAISE   Status: Acute   Comment:     





(3) Stenosis of left subclavian vein


Code(s): I87.1 - COMPRESSION OF VEIN   Status: Acute   Comment: Seen on AV 

shunt angiogram- pt had L arm edema.


Will need new access in the future.    





(4) Thrombocytopenia


Code(s): D69.6 - THROMBOCYTOPENIA, UNSPECIFIED   Status: Acute   





(5) Anemia due to end stage renal disease


Code(s): N18.6 - END STAGE RENAL DISEASE; D63.1 - ANEMIA IN CHRONIC KIDNEY 

DISEASE   Status: Chronic   Comment:     





(6) Coronary artery disease


Code(s): I25.10 - ATHSCL HEART DISEASE OF NATIVE CORONARY ARTERY W/O ANG PCTRS 

  Status: Chronic   


Qualifiers: 


   Coronary Disease-Associated Artery/Lesion type: unspecified vessel or lesion 

type   Native vs. transplanted heart: native heart   Associated angina: without 

angina   Qualified Code(s): I25.10 - Atherosclerotic heart disease of native 

coronary artery without angina pectoris   


Comment:  


   





(7) Diabetes mellitus


Code(s): E11.9 - TYPE 2 DIABETES MELLITUS WITHOUT COMPLICATIONS   Status: 

Chronic   


Qualifiers: 


   Diabetes mellitus type: type 2   Diabetes mellitus complication status: with 

kidney complications   Diabetes mellitus complication detail: with chronic 

kidney disease   Chronic kidney disease stage: on chronic dialysis 


Comment:     





(8) Dyslipidemia


Code(s): E78.5 - HYPERLIPIDEMIA, UNSPECIFIED   Status: Chronic   





(9) ESRD (end stage renal disease) on dialysis


Code(s): N18.6 - END STAGE RENAL DISEASE; Z99.2 - DEPENDENCE ON RENAL DIALYSIS 

  Status: Chronic   Comment:  


   





(10) History of DVT (deep vein thrombosis)


Code(s): Z86.718 - PERSONAL HISTORY OF OTHER VENOUS THROMBOSIS AND EMBOLISM   

Status: Chronic   Comment: On AC w Eliquis. 


   





(11) Hypertension


Code(s): I10 - ESSENTIAL (PRIMARY) HYPERTENSION   Status: Chronic   


Qualifiers: 


   Hypertension type: essential hypertension   Qualified Code(s): I10 - 

Essential (primary) hypertension   


Comment: At goal. 





   





(12) Hypertensive kidney disease with end-stage renal disease on dialysis


Code(s): I12.0 - HYP CHR KIDNEY DISEASE W STAGE 5 CHR KIDNEY DISEASE OR ESRD; 

N18.6 - END STAGE RENAL DISEASE; Z99.2 - DEPENDENCE ON RENAL DIALYSIS   Status: 

Chronic   





(13) Obesity


Code(s): E66.9 - OBESITY, UNSPECIFIED   Status: Chronic   





(14) Sacral decubitus ulcer


Status: Chronic   Comment: s/p debridement. 


Cx grew E. Coli, barker sensitive.


Started on Omnicef. 





   





(15) Secondary hyperparathyroidism of renal origin


Code(s): N25.81 - SECONDARY HYPERPARATHYROIDISM OF RENAL ORIGIN   Status: 

Chronic   





(16) Uterine cancer


Code(s): C55 - MALIGNANT NEOPLASM OF UTERUS, PART UNSPECIFIED   Status: Chronic

   Comment: s/p AWA BSO   





(17) Hyperkalemia


Code(s): E87.5 - HYPERKALEMIA   Status: Resolved   





(18) Hypoglycemia


Code(s): E16.2 - HYPOGLYCEMIA, UNSPECIFIED   Status: Resolved   Comment:     





(19) Metabolic acidosis


Code(s): E87.2 - ACIDOSIS   Status: Resolved   Comment: 2/2 ESRD. HD per 

nephrology.    





- Plan


cont current plan of care, continue antibiotics, PT/OT, 





* medication reviewed as below


* symptomatic treatment


* today plan for DC to SNU when wound vac arranged there


* spoke with 


* medically stable 


* see discharge princess.








Review of Systems





- Review of Systems


ENT: negative: Ear Pain, Ear Discharge, Nose Pain, Nose Discharge, Nose 

Congestion, Mouth Pain, Mouth Swelling, Throat Pain, Throat Swelling, Other


Respiratory: negative: Cough, Dry, Shortness of Breath, Hemoptysis, SOB with 

Excertion, Pleuritic Pain, Sputum, Wheezing


Cardiovascular: negative: chest pain, palpitations, orthopnea, paroxysmal 

nocturnal dyspnea, edema, light headedness, other


Gastrointestinal: negative: Nausea, Vomiting, Abdominal Pain, Diarrhea, 

Constipation, Melena, Hematochezia, Other


Genitourinary: negative: Dysuria, Frequency, Incontinence, Hematuria, Retention

, Other


Musculoskeletal: negative: Neck Pain, Shoulder Pain, Arm Pain, Back Pain, Hand 

Pain, Leg Pain, Foot Pain, Other


Skin: negative: Rash, Lesions, Roney, Bruising, Other





- Medications/Allergies


Allergies/Adverse Reactions: 


 Allergies











Allergy/AdvReac Type Severity Reaction Status Date / Time


 


ofloxacin [From Floxin] Allergy Mild  Verified 12/23/17 15:25


 


metronidazole [From Flagyl] Allergy   Verified 12/23/17 15:25











Medications: 


 Current Medications





Acetaminophen (Tylenol)  650 mg PO Q4H PRN


   PRN Reason: Headache/Fever or Pain


   Last Admin: 02/08/18 07:45 Dose:  650 mg


Acetaminophen (Tylenol)  1,000 mg PO Q6H PRN


   PRN Reason: Moderate to Severe Pain (6-10)


Hydrocodone Bitart/Acetaminophen (Norco 5/325)  1 tab PO TID PRN


   PRN Reason: Moderate Pain (4-6)


   Last Admin: 02/11/18 20:24 Dose:  1 tab


Amitriptyline HCl (Elavil)  75 mg PO HS CODI


   Last Admin: 02/11/18 20:22 Dose:  75 mg


Amoxicillin/Clavulanate Potassium (Augmentin)  250 mg PO Q8HR [facility]


Atorvastatin Calcium (Lipitor)  20 mg PO [facility]


   Last Admin: 02/11/18 20:23 Dose:  20 mg


Bisacodyl (Dulcolax)  10 mg PO DAILYPRN PRN


   PRN Reason: Constipation


Carvedilol (Coreg)  6.25 mg PO BID [facility]


   Last Admin: 02/11/18 20:22 Dose:  6.25 mg


Clonidine (Catapres)  0.1 mg PO Q4H PRN


   PRN Reason: Systolic BP > 160


Dextrose/Water (Dextrose 50%)  25 gm SLOW IVP PRN PRN


   PRN Reason: Hypoglycemia


Epoetin Jadon (Procrit)  5,000 units IVP TuThSa [facility]


   Last Admin: 02/10/18 16:47 Dose:  5,000 units


Ferrous Sulfate (Feosol)  325 mg PO BID-[facility]


   Last Admin: 02/11/18 16:07 Dose:  325 mg


Folic Acid (Folvite)  1 mg PO DAILY [facility]


   Last Admin: 02/11/18 08:16 Dose:  1 mg


Glucagon (Glucagon)  1 mg IM PRN PRN


   PRN Reason: Hypoglycemia


Hydralazine HCl (Apresoline)  50 mg PO TID [facility]


   Last Admin: 02/11/18 20:23 Dose:  50 mg


Insulin Detemir 20 units/ (Miscellaneous Medication)  0.2 mls @ 0 mls/hr SC [facility]


   Last Admin: 02/04/18 21:41 Dose:  Not Given


Insulin Detemir 35 units/ (Miscellaneous Medication)  0.35 mls @ 0 mls/hr SC 

[facility]


   Last Admin: 02/11/18 08:17 Dose:  0.35 mls


Dextrose/Water (D5w)  1,000 mls @ 0 mls/hr IV .Q0M PRN; As Directed


   PRN Reason: Hypoglycemia


Insulin Human Lispro (Humalog)  0 units SC .MILD SLIDING SCALE PRN


   PRN Reason: Mild Correctional Scale


   Last Admin: 02/11/18 23:06 Dose:  5 unit


Morphine Sulfate (Morphine)  2 mg SLOW IVP Q4H PRN


   PRN Reason: Pain


   Last Admin: 02/10/18 12:11 Dose:  2 mg


Ondansetron HCl (Zofran Odt)  4 mg PO Q6H PRN


   PRN Reason: Nausea/Vomiting


Ondansetron HCl (Zofran)  4 mg IVP Q6H PRN


   PRN Reason: Nausea/Vomiting


   Last Admin: 02/06/18 08:32 Dose:  4 mg


Pantoprazole Sodium (Protonix)  40 mg PO Q24HR [facility]


   Last Admin: 02/11/18 08:16 Dose:  40 mg


Sevelamer Carbonate (Renvela)  800 mg PO TID-WM [facility]


   Last Admin: 02/11/18 17:06 Dose:  800 mg


Sodium Chloride (Flush - Normal Saline)  10 ml IVF Q12HR [facility]


   Last Admin: 02/11/18 20:27 Dose:  10 ml


Sodium Chloride (Flush - Normal Saline)  10 ml IVF PRN PRN


   PRN Reason: Saline Flush


Tramadol HCl (Ultram)  50 mg PO Q6H PRN


   PRN Reason: Moderate Pain (4-6)


   Last Admin: 02/10/18 20:35 Dose:  50 mg

## 2018-02-12 NOTE — RAD
PROCEDURES:

1.      Left upper extremity arteriovenous dialysis fistulogram with venogram to the SVC.

2.PTA focal severe instent stenosis left subclavian vein. 

 

2/9/18

 

HISTORY: 

Patient with left upper extremity arm and forearm swelling which started this morning. Patient has en
d stage renal disease and left upper extremity arteriovenous dialysis fistula. Fistulogram and venogr
am were requested. 

 

FLUOROSCOPY:

Total fluoroscopy time is 3.7 minutes with total dose of 37,778 mGy*cm2. 

 

TECHNIQUE:  

After informed consent was obtained, patient was placed on the angiography table in the supine positi
on. The left upper extremity was meticulously prepped and draped in the usual sterile fashion. Skin a
nd subcutaneous tissues were infiltrated with buffered 1% lidocaine for local anesthesia at the inten
ded puncture site. The left upper extremity arteriovenous dialysis fistula was accessed, directed in 
the venous direction and a 5 [place]i introducer sheath was placed. A fistulogram and venogram of the S
VC were performed. Manual compression was applied to the venous outflow, and contrast was faintly ref
luxed through the arteriovenous anastomosis. 

 

The introducer sheath was exchanged over a 0.035 inch Amplatz guidewire for a 6 [place]i vascular sheat
h. An 8 mm x 4 cm angioplasty balloon was placed over the guide wire and positioned across the focal 
area of severe narrowing in the left subclavian stent. Angioplasty was performed. Although the lumina
l narrowing was improved, persistent moderate area of narrowing persisted. As the result, the 8 mm di
ameter Angioplasty balloon was exchanged over the guidewire for a 10 mm x 4 cm angioplasty balloon. P
TA was performed in this region. Followup venogram demonstrates improved luminal diameter and brisk f
low throughout the fistula. As the result, the procedure was terminated. The guide wire and 6 [place]i 
vascular sheath were removed. Hemostasis was achieved with direct pressure. A dry sterile dressing wa
s placed. The patient tolerated the procedure well without immediate complication. 

 

FINDINGS:  

Left upper extremity arteriovenous dialysis fistula with two separate stents within the venous outflo
w, one of which is located within the left subclavian vein and demonstrates severe in-stent stenosis.
 PTA of this focal area of narrowing was obtained up to 10 mm in diameter with improvement in luminal
 diameter and brisk flow postprocedure. Patient also noted decrease in pressure sensation and relief 
from left upper extremity discomfort after angioplasty. Remainder of the venous outflow is patent to 
the SVC. Arteriovenous anastomosis is also patent. 

 

IMPRESSION:  

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 PTA was performed up to 10 mm in diameter with i
mprovement in luminal diameter and brisk flow throughout the fistula postprocedure.

## 2018-02-12 NOTE — ULT
RIGHT UPPER EXTREMITY VEIN MAPPING:

 

HISTORY: 

Dialysis access.

 

FINDINGS: 

 

CEPHALIC VEIN:

Proximal humerus     3.2 mm

Mid humerus          3.4 mm

Distal               3.1 mm

Elbow                3.4 mm

Proximal forearm     2.5 mm

Mid forearm          4.0 mm

Distal forearm       3.4 mm 

 

BASILIC VEIN:

Proximal humerus     5.3 mm

Mid humerus          3.8 mm

Distal               5.4 mm

Elbow                4.0 mm

Proximal forearm     4.0 mm

Mid forearm          3.9 mm

Distal forearm       1.4 mm

 

Right brachial artery 4 mm.

Right radial artery 1.7 mm.

Right ulnar artery 1.5 mm.

 

IMPRESSION: 

Vein mapping as discussed above.

 

POS: DANUTA

## 2018-02-12 NOTE — DIS
DATE OF ADMISSION:  02/02/2018

 

DATE OF DISCHARGE:  02/12/2018

 

PRIMARY CARE PHYSICIAN:  Jacobo Rose.

 

DISCHARGE DISPOSITION:  [facility].

 

PRIMARY DISCHARGE DIAGNOSES:

1.  Demand ischemia of myocardium.

2.  Physical deconditioning.

3.  Stenosis of left subclavian vein.

4.  Thrombocytopenia.

5.  Stage IV sacral decubitus ulcer, status post I&D.

6.  Metabolic acidosis.

7.  Hypoglycemia.

8.  Hyperkalemia.

 

SECONDARY DISCHARGE DIAGNOSES:  History of uterine cancer, secondary hyperparathyroidism of renal chao
gin, obesity with BMI of 43, end-stage renal disease on hemodialysis, history of DVT, hypertension, d
yslipidemia, diabetes type 2, coronary artery disease and anemia due to renal disease.

 

PRIMARY PROCEDURES/OPERATIONS:  Fistulogram, surgical debridement of decubitus ulcer, maintenance hem
odialysis.

 

RADIOLOGICAL INVESTIGATION:  Chest x-ray, AV shunts, angiogram marking, ultrasonography.

 

SIGNIFICANT LABS:  WBC 4.8, hemoglobin 7.2 and platelets 176,000.  INR 1.3.  Sodium 134, potassium 4.
3, BUN 26, creatinine 3.36 and calcium 8.5.  Sacral decubitus ulcer culture grew Proteus mirabilis, E
. coli, and anaerobic gram negative yadi and cocci.

 

DISCHARGE MEDICATIONS:  Augmentin 250 mg p.o. t.i.d. for 7 more days, Humalog insulin sliding scale, 
Tylenol 650 mg q.4 hourly p.r.n., amitriptyline 75 mg p.o. at bedtime, Lipitor 20 mg p.o. at bedtime,
 Tums 1000 mg p.o. q.4 hourly p.r.n., Coreg 6.25 mg p.o. b.i.d., clonidine 0.1 mg q.4 hourly p.r.n., 
hydralazine 50 mg p.o. t.i.d., Lantus 10 units in the evening and 25 units in the morning, Protonix 4
0 mg p.o. daily, MiraLax 17 grams p.o. daily, Renvela 800 mg p.o. t.i.d. and tramadol 50 mg q.4 hourl
y p.r.n.

 

CONTRAINDICATIONS:  None.

 

CODE STATUS:  FULL CODE.

 

INPATIENT CONSULTANTS:  Dr. Xiao was consulted for surgical debridement.  Dr. Guan was cabrera giles for dialysis.

 

TEST RESULTS PENDING ON DISCHARGE:  None.

 

ALLERGIES:  OFLOXACIN and FLAGYL.

 

DISCHARGE PLAN:  Post hospital, the patient is discharged [facility] with woun
d VAC.

 

HOSPITAL COURSE:  A 62-year-old female who was admitted by Dr. Adiel Bowling on 02/02/2018.  Please see h
is H&P for further details.  On admission, the patient was confused.  She was having more weakness.  
She was on anticoagulation therapy for DVT.  This patient had physical deconditioning and she had sac
ral decubitus ulcer that required debridement by surgeon.  Patient was getting maintenance hemodialys
is per Nephrology.  This patient was having sepsis with demand ischemia of myocardium and she had sig
nificant physical deconditioning.  This patient was qualified for skilled nursing care at [facility].  Wound VAC is already arranged.  On discharge, we changed to Augmentin.  The res
t of medication was continued as per previous.  While in the hospital, she had thrombocytopenia that 
is improving.  Hypoglycemia, resolved.  Hyperkalemia, resolved.  Metabolic acidosis also resolved.

 

Patient is seen and examined at bedside today.  Please see my progress note from today for further de
tails.  Old chart reviewed today and this patient is medically stable for discharge today.  She will 
need access for dialysis in the near future and for that reason, the patient will follow up with Dr. Xiao.

 

Total time spent on discharge day more than 30 minutes.

## 2018-02-12 NOTE — PRG
DATE OF SERVICE:  02/12/2018

 

SUBJECTIVE:  This is a 62-year-old female being seen for end-stage renal disease.  The patient denies
 any nausea, vomiting, or chest pain.

 

PHYSICAL EXAMINATION:

GENERAL:  Patient is awake, alert.

VITAL SIGNS:  Afebrile, pulse 80, breathing at 16, blood pressure 112/60.

HEAD/NECK:  Normocephalic.  Atraumatic.

EYES:  EOMI.  No deformity.

EARS:  Clear.  No ulcers.

NOSE:  Intact.  No lesions.

MOUTH:  Clear.  No discharge.

THROAT:  Clear.  No exudate.

LUNGS:  Clear.  No crackles.

CARDIAC:  S1, S2.  No rub.

ABDOMEN:  Benign.  BS+.

GENITALIA/RECTUM:  Alonso absent.

BACK/EXTREMITIES:  Edema 0+ Ulcer-

NEUROLOGICAL:  Alert and motor intact.

SKIN:  Rash- Bruise-

LYMPHATICS:  Edema- Ulcer-

 

LABORATORY DATA:  Hemoglobin 7.2, potassium 4.6.

 

ASSESSMENT AND RECOMMENDATIONS:

1.  Chronic kidney disease, continue on hemodialysis Tuesday, Thursday, and Saturday.

2.  Anemia, plan transfusion.

3.  Hypertension, stable.

4.  Medications based on glomerular filtration rate are appropriate.

## 2018-02-13 NOTE — SPC
PROCEDURES:

1.      Left upper extremity arteriovenous dialysis fistulogram with venogram to the SVC.

2.PTA focal severe instent stenosis left subclavian vein. 

 

2/9/18

 

HISTORY: 

Patient with left upper extremity arm and forearm swelling which started this morning. Patient has en
d stage renal disease and left upper extremity arteriovenous dialysis fistula. Fistulogram and venogr
am were requested. 

 

FLUOROSCOPY:

Total fluoroscopy time is 3.7 minutes with total dose of 37,778 mGy*cm2. 

 

TECHNIQUE:  

After informed consent was obtained, patient was placed on the angiography table in the supine positi
on. The left upper extremity was meticulously prepped and draped in the usual sterile fashion. Skin a
nd subcutaneous tissues were infiltrated with buffered 1% lidocaine for local anesthesia at the inten
ded puncture site. The left upper extremity arteriovenous dialysis fistula was accessed, directed in 
the venous direction and a 5 Turkish introducer sheath was placed. A fistulogram and venogram of the S
VC were performed. Manual compression was applied to the venous outflow, and contrast was faintly ref
luxed through the arteriovenous anastomosis. 

 

The introducer sheath was exchanged over a 0.035 inch Amplatz guidewire for a 6 Turkish vascular sheat
h. An 8 mm x 4 cm angioplasty balloon was placed over the guide wire and positioned across the focal 
area of severe narrowing in the left subclavian stent. Angioplasty was performed. Although the lumina
l narrowing was improved, persistent moderate area of narrowing persisted. As the result, the 8 mm di
ameter Angioplasty balloon was exchanged over the guidewire for a 10 mm x 4 cm angioplasty balloon. P
TA was performed in this region. Followup venogram demonstrates improved luminal diameter and brisk f
low throughout the fistula. As the result, the procedure was terminated. The guide wire and 6 Turkish 
vascular sheath were removed. Hemostasis was achieved with direct pressure. A dry sterile dressing wa
s placed. The patient tolerated the procedure well without immediate complication. 

 

FINDINGS:  

Left upper extremity arteriovenous dialysis fistula with two separate stents within the venous outflo
w, one of which is located within the left subclavian vein and demonstrates severe in-stent stenosis.
 PTA of this focal area of narrowing was obtained up to 10 mm in diameter with improvement in luminal
 diameter and brisk flow postprocedure. Patient also noted decrease in pressure sensation and relief 
from left upper extremity discomfort after angioplasty. Remainder of the venous outflow is patent to 
the SVC. Arteriovenous anastomosis is also patent. 

 

IMPRESSION:  

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 PTA was performed up to 10 mm in diameter with i
mprovement in luminal diameter and brisk flow throughout the fistula postprocedure.

## 2018-02-23 ENCOUNTER — HOSPITAL ENCOUNTER (OUTPATIENT)
Dept: HOSPITAL 92 - SDC | Age: 63
Discharge: HOME | End: 2018-02-23
Attending: SURGERY
Payer: MEDICARE

## 2018-02-23 DIAGNOSIS — I25.10: ICD-10-CM

## 2018-02-23 DIAGNOSIS — Z88.1: ICD-10-CM

## 2018-02-23 DIAGNOSIS — Z88.8: ICD-10-CM

## 2018-02-23 DIAGNOSIS — Z99.2: ICD-10-CM

## 2018-02-23 DIAGNOSIS — E11.22: ICD-10-CM

## 2018-02-23 DIAGNOSIS — N18.6: ICD-10-CM

## 2018-02-23 DIAGNOSIS — E66.01: ICD-10-CM

## 2018-02-23 DIAGNOSIS — Z98.890: ICD-10-CM

## 2018-02-23 DIAGNOSIS — E78.5: ICD-10-CM

## 2018-02-23 DIAGNOSIS — N25.81: ICD-10-CM

## 2018-02-23 DIAGNOSIS — I12.0: Primary | ICD-10-CM

## 2018-02-23 LAB
ANION GAP SERPL CALC-SCNC: 15 MMOL/L (ref 10–20)
BUN SERPL-MCNC: 28 MG/DL (ref 9.8–20.1)
CALCIUM SERPL-MCNC: 8.1 MG/DL (ref 7.8–10.44)
CHLORIDE SERPL-SCNC: 97 MMOL/L (ref 98–107)
CO2 SERPL-SCNC: 24 MMOL/L (ref 23–31)
CREAT CL PREDICTED SERPL C-G-VRATE: 31 ML/MIN (ref 70–130)
GLUCOSE SERPL-MCNC: 145 MG/DL (ref 80–115)
POTASSIUM SERPL-SCNC: 4.2 MMOL/L (ref 3.5–5.1)
SODIUM SERPL-SCNC: 132 MMOL/L (ref 136–145)

## 2018-02-23 PROCEDURE — 36416 COLLJ CAPILLARY BLOOD SPEC: CPT

## 2018-02-23 PROCEDURE — 031B0AF BYPASS RIGHT RADIAL ARTERY TO LOWER ARM VEIN WITH AUTOLOGOUS ARTERIAL TISSUE, OPEN APPROACH: ICD-10-PCS | Performed by: SURGERY

## 2018-02-23 PROCEDURE — 36415 COLL VENOUS BLD VENIPUNCTURE: CPT

## 2018-02-23 PROCEDURE — 80048 BASIC METABOLIC PNL TOTAL CA: CPT

## 2018-02-23 NOTE — PQF
SILKE ELIAS SALIM NOORJIBHAI MD

[id number]                                                             [id number]

[id number]                             

                                   

CLINICAL DOCUMENTATION CLARIFICATION FORM:  POST DISCHARGE



Addendum to original discharge summary date:  __________________________________
____



Late entry note date:  _________________________________________________________
__



SILKE ELIAS             [id number]                  [id number]



                     PLEASE DOCUMENT YOUR RESPONSE BELOW

                                                PLEASE FAX RESPONSE BACK -
[phone]

                      YOUR INPUT IS NEEDED TO CORRECTLY CODE A DIAGNOSIS FOR 
YOUR PATIENT.





DATE:  02/23/2018                                                    ATTN:  
Charmaine Hsieh MD



Please exercise your independent, professional judgment in responding to the 
clarification form. 

Clinical indicators are provided on the bottom of this form for your review





Please check appropriate box(s) to clarify if the following diagnosis has been 
ruled in our ruled out:  SEPSIS



[  ] Ruled in diagnosis

     [  ] Continue to treat        [  ] Resolved

[  ] Ruled out diagnosis

[  ] Cannot rule out diagnosis

[  ] Other diagnosis ___________

[ x ] Unable to determine



In addition, please specify:

Present on Admission (POA):  [  ] Yes             [  ] No             [ x ] 
Unable to determine



For continuity of documentation, please document condition throughout progress 
notes and discharge summary.  Thank You.





CLINICAL INDICATORS - SIGNS / SYMPTOMS / LABS

DISCHARGE SUMMARY

    SIGNIFICANT LABS: WBC 4.8, hemoglobin 7.2 and platelets 176,000. INR 1.3. 
Sodium 134, potassium 4.3, BUN 26, creatinine 3.36 and calcium 8.5. Sacral 
decubitus ulcer grew Proteus mirabilis, E.coli, and anaerobic gram negative yadi 
an cocci.

    HOSPITAL COURSE: "The patient was having sepsis with demand ischemia of 
myocardium and she had significant physical deconditioning."



H&P

    VITAL SIGNS: Blood pressure 117/41, pulse 89, respirations 16, and O2 sat 99
% on 2 liters, temperature 99.2

    LABORATORY DATA: White blood cell count 7.6



CONSULTATION: 02/02

    ASSESSMENT AND PLAN: Metabolic Acidosis



ER

    VITAL SIGNS: /73, Pulse 114, Resp 20, Temp 99.1 (Oral), Pain: 8, O2 
sat 93 on Room Air 

    DIAGNOSIS

           Final: PRIMARY: ESRD, ADDITIONAL: Hyperkalemia, Indeterminate 
troponin.





RISK FACTORS

Diabetes

Cancer

Surgery

Advancing Age





TREATMENTS

Daily CBC, blood/sputum/wound cx

IV Fuids





(This form is maintained as a part of the permanent medical record)

2015 [employer].  All Rights Reserved

Marquise [email or web address]    [phone]

MTDD

## 2018-02-23 NOTE — PQF
SILKE ELIAS RICHARD D MD

[id number]                                                             [id number]

[id number]                             

                                   

CLINICAL DOCUMENTATION CLARIFICATION FORM:  POST DISCHARGE



Addendum to original discharge summary date:  __________________________________
____



Late entry note date:  _________________________________________________________
__





DATE:  02/23/2018                                           ATTN:  Rex Xiao MD



Please exercise your independent, professional judgment in responding to the 
clarification form. 

Clinical indicators are provided on the bottom of this form for your review



Please check appropriate box(s):

[  ] Excisional Debridement: 

   [  ] Excised [  ] Cut away  [  ] Other: _________________

   Depth / layer: (deepest layer of debridement): 

         [  ] Skin[  ] Subcutaneous Tissue     [  ] Fascia      [  ] Muscle     
[  ] Tendon     [  ] Bone

   Appearance of wound: (e.g., down to fresh bleeding tissue, etc.)_____________
__________

   Margins: (please specify): _________ / _____ x _____ x _____

   Instruments used:  [  ] Scissors    [  ] Scalpel   [  ] Other: ______________
______________ 

                                                                               
                                                                               
                                                                               
                                                  

[  ] Non-excisional Debridement: (Removal by flushing, brushing, chemical, or 
washing)

            Depth / layer: (deepest layer of debridement):

                  [  ] Skin[  ] Subcutaneous     [  ] Fascia      [  ] Muscle  
   [  ] Tendon     [  ] Bone



[  ] Incision and Drainage only (No Debridement):  

         Depth:[  ] Skin    [  ]  Subcutaneous  [  ] Fascia  [  ] Muscle    [  
] Tendon [  ] Bone



[  ] Escharectomy 



[  ] Other procedure diagnosis ___________ 



[  ] Unable to determine



For continuity of documentation, please document condition throughout progress 
notes and discharge summary.  Thank You.





CLINICAL INDICATORS - SIGNS / SYMPTOMS / LABS:

PROCEDURES: Debridement of skin and subcutaneous tissue to the deep sacrum, 
mostly right buttocks. Wound left open to heal by secondary intention. 
Surgicell and Isiah placed for hemostasis. Wound VAC to be applied tomorrow





RISK FACTORS:

Necrotic sacral decubitus





TREATMENTS:

Debridement, Wound VAC





(This form is maintained as a part of the permanent medical record)

2015 [employer].  All Rights Reserved

Marquise [email or web address]    [phone]

MTDD

## 2018-02-23 NOTE — OP
DATE OF OPERATION:  02/23/2018

 

PREOPERATIVE DIAGNOSES:  Morbid obesity, nursing home resident, end-stage renal disease, failing fist
tara with multiple stents in subclavian vein along the fistula in the left arm.

 

PROCEDURES:  Right arm primary fistula, perforating branch antecubital vein, outflow primarily basili
c vein with probably outflow short segment cephalic vein (occluded by coronary dilators in the distal
 upper arm).  Retrograde antecubital vein preserved.  A 3.5 mm coronary dilator passing nonobstructed
 out the basilic vein and an excellent Doppler signal at the end.  The patient will need basilic vein
 transposition fistula in the future.

 

SURGEON:  Rex Xiao M.D.

 

ANESTHESIA:  Regional TIVA.

 

PROCEDURE IN DETAIL:  Patient was taken to the operating room where under regional anesthesia and TIV
A anesthesia, right upper extremity was prepared with ChloraPrep, draped in routine fashion.  Incisio
n made in the proximal volar forearm, carried down through the skin and subcutaneous tissue.  Antecub
ital vein was identified and the perforating branch dissected free and branches divided between clips
 and it was spatulated over a branch point and interrogated with coronary dilators.  Patient was give
n 6000 units of heparin intravenously.  Dilators from a 2 mm to 3.5 mm coronary dilators passed out t
he basilic vein outflow without obstruction.  Dilators to 3 mm coronary dilators passed out the cepha
lic vein, although it seemed to me obstructed in the distal upper arm.  Retrograde antecubital vein p
reserved.  Perforating branch shows excellent caliber and condition.  The proximal radial artery was 
dissected free and slightly arteriosclerotic but was of good caliber.  After adequate heparin circula
tion time, proximal radial artery was clamped proximally and distally and longitudinal arteriotomy ma
de sharply and elongated with Solano scissors and vein accordingly spatulated for 2 cm anastomosis bet
ween the end perforating branch of antecubital vein to the side of radial artery using continuous 6-0
 Prolene.  Once anastomosis was complete, vascular clamps released and there was good outflow through
 the fistula with a Doppler signal noted in the distal cephalic vein, but not in the upper arm corres
ponding to findings above.  A good signal noted in the basilic vein outflow.  Good hemostasis noted. 
 The patient was given 25 mg of protamine intravenously by Anesthesia.  The patient tolerated the pro
cedure well and subcutaneous tissues approximated with 3-0 Monocryl, skin with subdermal 4-0 Monocryl
 and DermaGlue applied.

## 2022-10-13 NOTE — PDOC.PN
- Subjective


Encounter Start Date: 02/07/18


Encounter Start Time: 13:09


Subjective: No new complaints


-: No acute events overnight. 





- Objective


Resuscitation Status: 


 











Resuscitation Status           FULL:Full Resuscitation














MAR Reviewed: Yes


Vital Signs & Weight: 


 Vital Signs (12 hours)











  Temp Pulse Resp BP BP BP Pulse Ox


 


 02/07/18 11:24  99.5 F  87  16    143/76 H  99


 


 02/07/18 08:08   89   173/66 H   


 


 02/07/18 08:00  99.5 F  87  16    


 


 02/07/18 06:19     155/67 H   


 


 02/07/18 04:00  99.2 F  93  18   155/67 H   98








 Weight











Admit Weight                   246 lb 14.4 oz


 


Weight                         224 lb 9.6 oz














I&O: 


 











 02/06/18 02/07/18 02/08/18





 06:59 06:59 06:59


 


Intake Total 400 705 


 


Output Total 2000 0 


 


Balance -1600 705 











Result Diagrams: 


 02/07/18 05:36





 02/07/18 05:36


Additional Labs: 


 Accuchecks











  02/07/18 02/07/18 02/06/18





  11:51 04:43 18:14


 


POC Glucose  176 H  145 H  162 H














Phys Exam





- Physical Examination


HEENT: PERRLA, moist MMs, sclera anicteric


Neck: supple, full ROM


Respiratory: no wheezing, no rales, no rhonchi, clear to auscultation bilateral


Cardiovascular: RRR, no significant murmur, no rub


Gastrointestinal: soft, non-tender, no distention, positive bowel sounds


Musculoskeletal: no edema, pulses present


Neurological: non-focal, moves all 4 limbs


Psychiatric: normal affect


Skin: no rash, normal turgor





Dx/Plan


(1) ESRD (end stage renal disease) on dialysis


Code(s): N18.6 - END STAGE RENAL DISEASE; Z99.2 - DEPENDENCE ON RENAL DIALYSIS 

  Status: Chronic   Comment: On TTS schedule. Missed at least 2 sessions before 

this admission.


Will continur TS schedule while on admission


Renal on board. 


   





(2) History of DVT (deep vein thrombosis)


Code(s): Z86.718 - PERSONAL HISTORY OF OTHER VENOUS THROMBOSIS AND EMBOLISM   

Status: Acute   Comment: On AC w Eliquis. 


   





(3) Coronary artery disease


Code(s): I25.10 - ATHSCL HEART DISEASE OF NATIVE CORONARY ARTERY W/O ANG PCTRS 

  Status: Chronic   


Qualifiers: 


   Coronary Disease-Associated Artery/Lesion type: unspecified vessel or lesion 

type   Native vs. transplanted heart: native heart   Associated angina: without 

angina   Qualified Code(s): I25.10 - Atherosclerotic heart disease of native 

coronary artery without angina pectoris   


Comment: Stable, chest pain free. 


Continue home regimen. 


   





(4) Hypertension


Code(s): I10 - ESSENTIAL (PRIMARY) HYPERTENSION   Status: Chronic   


Qualifiers: 


   Hypertension type: essential hypertension   Qualified Code(s): I10 - 

Essential (primary) hypertension   


Plan: 


Continue home medications. 





Comment: At goal. 





   





(5) Diabetes mellitus


Code(s): E11.9 - TYPE 2 DIABETES MELLITUS WITHOUT COMPLICATIONS   Status: 

Chronic   


Qualifiers: 


   Diabetes mellitus complication status: with kidney complications   Diabetes 

mellitus complication detail: with chronic kidney disease   Chronic kidney 

disease stage: on chronic dialysis 


Plan: 


Fair control


Continue current management. 





Comment: on SSI


Long acting insulin discontinued 2/2 hypoglycemia. 


   





(6) Hypoglycemia


Code(s): E16.2 - HYPOGLYCEMIA, UNSPECIFIED   Status: Resolved   Comment: 

Improving. 


Long acting insulin being held. 


Continue SSI   





(7) Physical deconditioning


Code(s): R53.81 - OTHER MALAISE   Status: Acute   Comment: Likely 2/2 prolonged 

hospitalization.


Will need NICKY placement.    





(8) Sacral decubitus ulcer


Status: Acute   


Qualifiers: 


   Pressure ulcer stage: unstageable   Qualified Code(s): L89.150 - Pressure 

ulcer of sacral region, unstageable   


Comment: Unstageable 2/2 necrotic tissue. 


Gen Surgery evaluated. Will be taken to OR for debridement. 


   





- Plan


cont current plan of care, PT/OT, 


Awaiting NICKY. WIll f/u with CM





* . -- DO NOT REPLY / DO NOT REPLY ALL --  -- Message is from [facility] ([facility]) --    General Patient Message patient is calling after covid positive on 10/8//22 and no other symptoms other than cough . Patient is asking for advice and recommendations on what to do to help with symptoms of constant cough  .     Caller Information       Type Contact Phone/Fax    10/13/2022 09:21 AM CDT Phone (Incoming) Lois Butt (Self) [phone] (M)        Alternative phone number: none     Can a detailed message be left? Yes    Message Turnaround:     Is it Working Hours? Yes - Working Hours     [place]:    Please give this turnaround time to the caller:   \"This message will be sent to [state Provider's name]. The clinical team will fulfill your request as soon as they review your message.\"